# Patient Record
Sex: MALE | Race: WHITE
[De-identification: names, ages, dates, MRNs, and addresses within clinical notes are randomized per-mention and may not be internally consistent; named-entity substitution may affect disease eponyms.]

---

## 2019-10-26 ENCOUNTER — HOSPITAL ENCOUNTER (EMERGENCY)
Dept: HOSPITAL 97 - ER | Age: 64
Discharge: HOME | End: 2019-10-26
Payer: COMMERCIAL

## 2019-10-26 VITALS — TEMPERATURE: 98.8 F

## 2019-10-26 VITALS — SYSTOLIC BLOOD PRESSURE: 131 MMHG | DIASTOLIC BLOOD PRESSURE: 86 MMHG | OXYGEN SATURATION: 99 %

## 2019-10-26 DIAGNOSIS — I10: Primary | ICD-10-CM

## 2019-10-26 LAB
ALBUMIN SERPL BCP-MCNC: 3.3 G/DL (ref 3.4–5)
ALP SERPL-CCNC: 65 U/L (ref 45–117)
ALT SERPL W P-5'-P-CCNC: 61 U/L (ref 12–78)
AST SERPL W P-5'-P-CCNC: 35 U/L (ref 15–37)
BUN BLD-MCNC: 13 MG/DL (ref 7–18)
GLUCOSE SERPLBLD-MCNC: 104 MG/DL (ref 74–106)
HCT VFR BLD CALC: 40.8 % (ref 39.6–49)
INR BLD: 1.04
LYMPHOCYTES # SPEC AUTO: 1 K/UL (ref 0.7–4.9)
MAGNESIUM SERPL-MCNC: 2.3 MG/DL (ref 1.8–2.4)
NT-PROBNP SERPL-MCNC: 748 PG/ML (ref ?–125)
PMV BLD: 9.9 FL (ref 7.6–11.3)
POTASSIUM SERPL-SCNC: 4 MMOL/L (ref 3.5–5.1)
RBC # BLD: 4.67 M/UL (ref 4.33–5.43)
TROPONIN (EMERG DEPT USE ONLY): < 0.02 NG/ML (ref 0–0.04)

## 2019-10-26 PROCEDURE — 85025 COMPLETE CBC W/AUTO DIFF WBC: CPT

## 2019-10-26 PROCEDURE — 99284 EMERGENCY DEPT VISIT MOD MDM: CPT

## 2019-10-26 PROCEDURE — 83735 ASSAY OF MAGNESIUM: CPT

## 2019-10-26 PROCEDURE — 83880 ASSAY OF NATRIURETIC PEPTIDE: CPT

## 2019-10-26 PROCEDURE — 80076 HEPATIC FUNCTION PANEL: CPT

## 2019-10-26 PROCEDURE — 71045 X-RAY EXAM CHEST 1 VIEW: CPT

## 2019-10-26 PROCEDURE — 36415 COLL VENOUS BLD VENIPUNCTURE: CPT

## 2019-10-26 PROCEDURE — 85610 PROTHROMBIN TIME: CPT

## 2019-10-26 PROCEDURE — 84484 ASSAY OF TROPONIN QUANT: CPT

## 2019-10-26 PROCEDURE — 80048 BASIC METABOLIC PNL TOTAL CA: CPT

## 2019-10-26 PROCEDURE — 93005 ELECTROCARDIOGRAM TRACING: CPT

## 2019-10-26 NOTE — EDPHYS
Physician Documentation                                                                           

 [facility]                                                                 

Name: Zoltan Kirbyrzypnegrita                                                                              

Age: 64 yrs                                                                                       

Sex: Male                                                                                         

: 1955                                                                                   

MRN: [id number]                                                                                   

Arrival Date: 10/26/2019                                                                          

Time: 09:18                                                                                       

Account#: [id number]                                                                            

Bed 8                                                                                             

Private MD: Kj Geller E                                                                     

ED Physician Casey Espinoza                                                                         

HPI:                                                                                              

10/26                                                                                             

09:45 This 64 yrs old  Male presents to ER via Ambulatory with complaints of         pm1 

      Shortness Of Breath, High Blood Pressure.                                                   

09:45 Patient with complaints of shortness of breath and elevated blood pressure. Patient saw pm1 

      PCPDuyen recently and had labs. Was called in a prescription for Metoprolol 25 mg BID     

      and instructed to stop the losartan 50 / 12.5 HCTZ Daily. Patient's blood pressure          

      started getting elevated on Thursday. Reports shortness of breath started with elevated     

      blood pressure. No chest pain, cough, nausea or vomiting. Patient' reports highest          

      blood pressure readings of 190's systolic and 120's diastolic .                             

                                                                                                  

Historical:                                                                                       

- Allergies:                                                                                      

09:34 No Known Allergies;                                                                     sg  

- Home Meds:                                                                                      

09:34 Metoprolol Tartrate Oral [Active];                                                      sg  

- PMHx:                                                                                           

09:34 Hypertension;                                                                           sg  

- PSHx:                                                                                           

09:34 None;                                                                                   sg  

                                                                                                  

- Immunization history:: Adult Immunizations up to date.                                          

- Social history:: Smoking status: Patient/guardian denies using tobacco.                         

- Ebola Screening: : Patient negative for fever greater than or equal to 101.5 degrees            

  Fahrenheit, and additional compatible Ebola Virus Disease symptoms Patient denies               

  exposure to infectious person Patient denies travel to an Ebola-affected area in the            

  21 days before illness onset No symptoms or risks identified at this time.                      

                                                                                                  

                                                                                                  

ROS:                                                                                              

16:02 Constitutional: Negative for fever, chills, and weight loss, Eyes: Negative for injury, pm1 

      pain, redness, and discharge, ENT: Negative for injury, pain, and discharge, Neck:          

      Negative for injury, pain, and swelling, Cardiovascular: Negative for chest pain,           

      palpitations, and edema.                                                                    

16:02 Abdomen/GI: Negative for abdominal pain, nausea, vomiting, diarrhea, and constipation,      

      Back: Negative for injury and pain, : Negative for injury, bleeding, discharge, and       

      swelling, MS/Extremity: Negative for injury and deformity, Skin: Negative for injury,       

      rash, and discoloration, Neuro: Negative for headache, weakness, numbness, tingling,        

      and seizure.                                                                                

16:02 Respiratory: Positive for shortness of breath, Negative for cough, dyspnea on exertion,     

      sputum production, wheezing.                                                                

                                                                                                  

Exam:                                                                                             

16:02 Constitutional:  This is a well developed, well nourished patient who is awake, alert,  pm1 

      and in no acute distress. Head/Face:  Normocephalic, atraumatic. Eyes:  Pupils equal        

      round and reactive to light, extra-ocular motions intact.  Lids and lashes normal.          

      Conjunctiva and sclera are non-icteric and not injected.  Cornea within normal limits.      

      Periorbital areas with no swelling, redness, or edema. ENT:  Nares patent. No nasal         

      discharge, no septal abnormalities noted.  Tympanic membranes are normal and external       

      auditory canals are clear.  Oropharynx with no redness, swelling, or masses, exudates,      

      or evidence of obstruction, uvula midline.  Mucous membranes moist. Neck:  Trachea          

      midline, no thyromegaly or masses palpated, and no cervical lymphadenopathy.  Supple,       

      full range of motion without nuchal rigidity, or vertebral point tenderness.  No            

      Meningismus. Chest/axilla:  Normal chest wall appearance and motion.  Nontender with no     

      deformity.  No lesions are appreciated. Cardiovascular:  Regular rate and rhythm with a     

      normal S1 and S2.  No gallops, murmurs, or rubs.  Normal PMI, no JVD.  No pulse             

      deficits. Respiratory:  Lungs have equal breath sounds bilaterally, clear to                

      auscultation and percussion.  No rales, rhonchi or wheezes noted.  No increased work of     

      breathing, no retractions or nasal flaring. Abdomen/GI:  Soft, non-tender, with normal      

      bowel sounds.  No distension or tympany.  No guarding or rebound.  No evidence of           

      tenderness throughout. Back:  No spinal tenderness.  No costovertebral tenderness.          

      Full range of motion. Skin:  Warm, dry with normal turgor.  Normal color with no            

      rashes, no lesions, and no evidence of cellulitis. MS/ Extremity:  Pulses equal, no         

      cyanosis.  Neurovascular intact.  Full, normal range of motion.                             

16:02 Neuro: Orientation: is normal, Motor: is normal, moves all fours, Gait: is steady, at a     

      normal pace, without difficulty.                                                            

                                                                                                  

Vital Signs:                                                                                      

09:23  / 100; Pulse 63; Resp 18; Pulse Ox 100% on R/A;                                  sg  

09:44 Temp 98.8;                                                                              sg  

10:19  / 90; Pulse 60; Resp 17; Pulse Ox 100% on R/A;                                   sg  

11:13  / 86; Pulse 62; Resp 17; Pulse Ox 99% ;                                          sg  

                                                                                                  

MDM:                                                                                              

09:25 Patient medically screened.                                                             pm1 

11:28 Data reviewed: vital signs. Data interpreted: Pulse oximetry: on room air is 99 %.      pm1 

      Interpretation: normal.                                                                     

11:28 Counseling: I had a detailed discussion with the patient and/or guardian regarding: the pm1 

      historical points, exam findings, and any diagnostic results supporting the                 

      discharge/admit diagnosis, lab results, radiology results, the need for outpatient          

      follow up, to return to the emergency department if symptoms worsen or persist or if        

      there are any questions or concerns that arise at home.                                     

11:28 Physician consultation: Kj Geller MD No return phone call or answer with calls at   pm1 

      1050 and 1122. Informed patient that he has not called back and the patient told me         

      that he decided that he will go back to his losartan-HCTZ on his own until he follows       

      up next week. I told the patient that I cannot support his decision to take medications     

      without his PCP's advise. Patient likely needs dual therapy for blood pressure              

      management, perhaps losartan with metoprolol.                                               

                                                                                                  

10/26                                                                                             

09:33 Order name: Basic Metabolic Panel; Complete Time: 10:35                                 pm1 

10/26                                                                                             

09:33 Order name: CBC with Diff; Complete Time: 10:35                                         pm1 

10/26                                                                                             

09:33 Order name: LFT's; Complete Time: 10:35                                                 pm1 

10/26                                                                                             

09:33 Order name: Magnesium; Complete Time: 10:35                                             pm1 

10/26                                                                                             

09:33 Order name: NT PRO-BNP; Complete Time: 10:35                                            pm1 

10/26                                                                                             

09:33 Order name: PT-INR; Complete Time: 10:35                                                pm1 

10/26                                                                                             

09:33 Order name: Troponin (emerg Dept Use Only); Complete Time: 10:35                        pm1 

10/26                                                                                             

09:33 Order name: XRAY Chest (1 view); Complete Time: 11:56                                   pm1 

10/26                                                                                             

09:33 Order name: EKG; Complete Time: 09:34                                                   pm1 

10/26                                                                                             

09:33 Order name: Cardiac monitoring; Complete Time: 09:35                                    pm1 

10/26                                                                                             

09:33 Order name: EKG - Nurse/Tech; Complete Time: :44                                      pm1 

10/26                                                                                             

09:33 Order name: IV Saline Lock; Complete Time: :44                                        pm1 

10/26                                                                                             

09:33 Order name: Labs collected and sent; Complete Time: :44                               pm1 

10/26                                                                                             

09:33 Order name: O2 Per Protocol; Complete Time: 09:35                                       pm1 

10/26                                                                                             

09:33 Order name: O2 Sat Monitoring; Complete Time: :35                                     pm1 

                                                                                                  

Administered Medications:                                                                         

No medications were administered                                                                  

                                                                                                  

                                                                                                  

Disposition:                                                                                      

14:02 Co-signature as Attending Physician, Casey Espinoza MD.                                    rn  

                                                                                                  

Disposition:                                                                                      

10/26/19 11:29 Discharged to Home. Impression: Essential (primary) hypertension.                  

- Condition is Stable.                                                                            

- Discharge Instructions: Hypertension, How to Take Your Blood Pressure, Easy-to-Read,            

  DASH Eating Plan, Managing Your Hypertension.                                                   

                                                                                                  

- Medication Reconciliation Form, Thank You Letter, Antibiotic Education, Prescription            

  Opioid Use form.                                                                                

- Follow up: Emergency Department; When: As needed; Reason: Worsening of condition.               

  Follow up: Kj Geller MD; When: 2 - 3 days; Reason: Recheck today's complaints,            

  Continuance of care, Re-evaluation by your physician.                                           

- Problem is new.                                                                                 

- Symptoms have improved.                                                                         

                                                                                                  

                                                                                                  

                                                                                                  

Signatures:                                                                                       

Dispatcher [employer]                           EDZana Sandoval RN                         RN                                                      

Casey Espinoza MD MD rn Marinas, Patrick, NP                    NP   pm1                                                  

Alden Almendarez RN                      RN   bp                                                   

                                                                                                  

Corrections: (The following items were deleted from the chart)                                    

11:50 11:29 10/26/2019 11:29 Discharged to Home. Impression: Essential (primary)              bp  

      hypertension. Condition is Stable. Forms are Medication Reconciliation Form, Thank You      

      Letter, Antibiotic Education, Prescription Opioid Use. Follow up: Emergency Department;     

      When: As needed; Reason: Worsening of condition. Follow up: Kj Geller; When: 2 - 3      

      days; Reason: Recheck today's complaints, Continuance of care, Re-evaluation by your        

      physician. Problem is new. Symptoms have improved. pm1                                      

                                                                                                  

**************************************************************************************************

## 2019-10-26 NOTE — RAD REPORT
EXAM DESCRIPTION:  [facility] Single View10/26/2019 9:55 am

 

CLINICAL HISTORY:  sob

 

COMPARISON:  none

 

FINDINGS:   The lungs appear clear of acute infiltrate. The heart is normal size

 

IMPRESSION:   No acute abnormalities displayed

## 2019-10-26 NOTE — ER
Nurse's Notes                                                                                     

 [facility]                                                                 

Name: Zoltan Langston                                                                              

Age: 64 yrs                                                                                       

Sex: Male                                                                                         

: 1955                                                                                   

MRN: [id number]                                                                                   

Arrival Date: 10/26/2019                                                                          

Time: 09:18                                                                                       

Account#: [id number]                                                                            

Bed 8                                                                                             

Private MD: Kj Geller E                                                                     

Diagnosis: Essential (primary) hypertension                                                       

                                                                                                  

Presentation:                                                                                     

10/26                                                                                             

09:25 Presenting complaint: Patient states: My Blood pressure has been running high, and I    sg  

      have had some shortness of breath that has worsened today, denies Dizziness, Chest          

      Pain, denies N/V/D/Fever. Reports recently changed from Losartan to Metoprolol but          

      unsure of why  made the change other than having elevated renal labs.               

      Transition of care: patient was not received from another setting of care. Onset of         

      symptoms was 2019. Risk Assessment: Do you want to hurt yourself or someone     

      else? Patient reports no desire to harm self or others. Initial Sepsis Screen: Does the     

      patient meet any 2 criteria? No. Patient's initial sepsis screen is negative. Does the      

      patient have a suspected source of infection? No. Patient's initial sepsis screen is        

      negative. Care prior to arrival: None.                                                      

09:25 Method Of Arrival: Ambulatory                                                           sg  

09:25 Acuity: AIDEE 3                                                                           sg  

                                                                                                  

Historical:                                                                                       

- Allergies:                                                                                      

09:34 No Known Allergies;                                                                     sg  

- Home Meds:                                                                                      

09:34 Metoprolol Tartrate Oral [Active];                                                      sg  

- PMHx:                                                                                           

09:34 Hypertension;                                                                           sg  

- PSHx:                                                                                           

09:34 None;                                                                                   sg  

                                                                                                  

- Immunization history:: Adult Immunizations up to date.                                          

- Social history:: Smoking status: Patient/guardian denies using tobacco.                         

- Ebola Screening: : Patient negative for fever greater than or equal to 101.5 degrees            

  Fahrenheit, and additional compatible Ebola Virus Disease symptoms Patient denies               

  exposure to infectious person Patient denies travel to an Ebola-affected area in the            

  21 days before illness onset No symptoms or risks identified at this time.                      

                                                                                                  

                                                                                                  

Screenin:34 Abuse screen: Denies threats or abuse. Denies injuries from another. Nutritional        sg  

      screening: No deficits noted. Tuberculosis screening: No symptoms or risk factors           

      identified. Never had TB. Fall Risk None identified.                                        

                                                                                                  

Assessment:                                                                                       

09:34 General: Appears in no apparent distress. well groomed, well developed, well nourished, sg  

      Behavior is calm, cooperative, appropriate for age. Pain: Denies pain. Neuro: Level of      

      Consciousness is awake, alert, obeys commands, Oriented to person, place, time,        

      are equal bilaterally Moves all extremities. Speech is normal, Facial symmetry appears      

      normal, Denies weakness blurred vision dizziness, difficulty swallowing, paresthesias       

      numbness headache photophobia diplopia. Cardiovascular: Capillary refill is brisk in        

      bilateral fingers Patient's skin is warm and dry. Chest pain is denied. Respiratory:        

      Airway is patent Respiratory effort is even, unlabored, Respiratory pattern is regular,     

      symmetrical, Breath sounds are clear bilaterally. GI: Abdomen is round obese, Reports       

      normal bowel habits, tolerance of fluids, tolerance of food. : No signs and/or            

      symptoms were reported regarding the genitourinary system. EENT: No signs and/or            

      symptoms were reported regarding the EENT system. Derm: Skin is pink, warm \T\ dry.         

      Musculoskeletal: Circulation, motion, and sensation intact. Range of motion: intact in      

      all extremities.                                                                            

10:30 Cardiovascular: Rhythm is sinus rhythm.                                                 bp  

11:48 Reassessment: PT D/C HOME AMBULATORY WITH FAMILY, DX WITH ESSENTIAL HTN.                bp  

                                                                                                  

Vital Signs:                                                                                      

09:23  / 100; Pulse 63; Resp 18; Pulse Ox 100% on R/A;                                  sg  

09:44 Temp 98.8;                                                                              sg  

10:19  / 90; Pulse 60; Resp 17; Pulse Ox 100% on R/A;                                   sg  

11:13  / 86; Pulse 62; Resp 17; Pulse Ox 99% ;                                          sg  

                                                                                                  

ED Course:                                                                                        

09:18 Patient arrived in ED.                                                                  ag5 

09:18 Kj Geller MD is Private Physician.                                                ag5 

09:22 Arm band placed on.                                                                     sg  

09:24 Zana Mann, RN is Primary Nurse.                                                       sg  

09:25 Celso Velasquez NP is PHCP.                                                           pm1 

09:25 Casey Espinoza MD is Attending Physician.                                                pm1 

09:33 Triage completed.                                                                       sg  

09:45 Patient has correct armband on for positive identification. Bed in low position. Call   bp  

      light in reach. Side rails up X2. Adult w/ patient.                                         

09:51 XRAY Chest (1 view) In Process Unspecified.                                             EDMS

09:55 EKG done, by ED staff, reviewed by Celso Velasquez NP.                                  jb1 

10:00 Initial lab(s) drawn, by me, sent to lab. Inserted saline lock: 22 gauge in right       sg  

      antecubital area, using aseptic technique. Blood collected.                                 

10:49 Nurse Practitioner and/or Physician Assistant to see patient.                           sg  

11:29 Kj Geller MD is Referral Physician.                                               pm1 

11:49 No provider procedures requiring assistance completed. IV discontinued, intact,         bp  

      bleeding controlled, No redness/swelling at site. Pressure dressing applied.                

                                                                                                  

Administered Medications:                                                                         

No medications were administered                                                                  

                                                                                                  

                                                                                                  

Outcome:                                                                                          

11:29 Discharge ordered by MD.                                                                pm1 

11:49 Discharged to home ambulatory, with family.                                             bp  

11:49 Condition: stable                                                                           

11:49 Discharge instructions given to patient, Instructed on discharge instructions, follow       

      up and referral plans. Demonstrated understanding of instructions, follow-up care.          

11:50 Patient left the ED.                                                                    bp  

                                                                                                  

Signatures:                                                                                       

Dispatcher [employer]                           EDEvan Pompa                                 jb1                                                  

Zana Mann, RN                         RN   sg                                                   

Celso Velasquez, NP                    NP   pm1                                                  

Alden Almendarez RN                      RN   bp                                                   

Dov Ruggiero                                ag5                                                  

                                                                                                  

**************************************************************************************************

## 2019-10-27 NOTE — EKG
Test Date:    2019-10-26               Test Time:    09:43:04

Technician:   JOAQUIN                                    

                                                     

MEASUREMENT RESULTS:                                       

Intervals:                                           

Rate:         64                                     

CA:           148                                    

QRSD:         106                                    

QT:           404                                    

QTc:          416                                    

Axis:                                                

P:            21                                     

CA:           148                                    

QRS:          14                                     

T:            43                                     

                                                     

INTERPRETIVE STATEMENTS:                                       

                                                     

Normal sinus rhythm

Normal ECG

No previous ECG available for comparison



Electronically Signed On 10-27-19 19:53:05 CDT by Jeffry Olea

## 2020-12-19 ENCOUNTER — HOSPITAL ENCOUNTER (INPATIENT)
Dept: HOSPITAL 97 - ER | Age: 65
LOS: 2 days | Discharge: HOME | DRG: 177 | End: 2020-12-21
Attending: FAMILY MEDICINE | Admitting: FAMILY MEDICINE
Payer: COMMERCIAL

## 2020-12-19 VITALS — BODY MASS INDEX: 39.7 KG/M2

## 2020-12-19 DIAGNOSIS — E87.0: ICD-10-CM

## 2020-12-19 DIAGNOSIS — K76.0: ICD-10-CM

## 2020-12-19 DIAGNOSIS — N28.9: ICD-10-CM

## 2020-12-19 DIAGNOSIS — D72.819: ICD-10-CM

## 2020-12-19 DIAGNOSIS — D69.6: ICD-10-CM

## 2020-12-19 DIAGNOSIS — Z79.52: ICD-10-CM

## 2020-12-19 DIAGNOSIS — N18.30: ICD-10-CM

## 2020-12-19 DIAGNOSIS — J12.89: ICD-10-CM

## 2020-12-19 DIAGNOSIS — E87.6: ICD-10-CM

## 2020-12-19 DIAGNOSIS — I12.9: ICD-10-CM

## 2020-12-19 DIAGNOSIS — Z79.899: ICD-10-CM

## 2020-12-19 DIAGNOSIS — E66.9: ICD-10-CM

## 2020-12-19 DIAGNOSIS — Z79.82: ICD-10-CM

## 2020-12-19 DIAGNOSIS — U07.1: Primary | ICD-10-CM

## 2020-12-19 DIAGNOSIS — J96.01: ICD-10-CM

## 2020-12-19 LAB
ALBUMIN SERPL BCP-MCNC: 3.3 G/DL (ref 3.4–5)
ALP SERPL-CCNC: 62 U/L (ref 45–117)
ALT SERPL W P-5'-P-CCNC: 45 U/L (ref 12–78)
AST SERPL W P-5'-P-CCNC: 44 U/L (ref 15–37)
BLD SMEAR INTERP: (no result)
BUN BLD-MCNC: 23 MG/DL (ref 7–18)
GLUCOSE SERPLBLD-MCNC: 99 MG/DL (ref 74–106)
HCT VFR BLD CALC: 42.2 % (ref 39.6–49)
INR BLD: 1.05
LIPASE SERPL-CCNC: 238 U/L (ref 73–393)
LYMPHOCYTES # SPEC AUTO: 0.5 K/UL (ref 0.7–4.9)
MAGNESIUM SERPL-MCNC: 2.4 MG/DL (ref 1.8–2.4)
MORPHOLOGY BLD-IMP: (no result)
PMV BLD: 10.4 FL (ref 7.6–11.3)
POTASSIUM SERPL-SCNC: 3.3 MMOL/L (ref 3.5–5.1)
RBC # BLD: 5.02 M/UL (ref 4.33–5.43)
TROPONIN (EMERG DEPT USE ONLY): < 0.02 NG/ML (ref 0–0.04)

## 2020-12-19 PROCEDURE — 85025 COMPLETE CBC W/AUTO DIFF WBC: CPT

## 2020-12-19 PROCEDURE — 82728 ASSAY OF FERRITIN: CPT

## 2020-12-19 PROCEDURE — 36415 COLL VENOUS BLD VENIPUNCTURE: CPT

## 2020-12-19 PROCEDURE — 85610 PROTHROMBIN TIME: CPT

## 2020-12-19 PROCEDURE — 94760 N-INVAS EAR/PLS OXIMETRY 1: CPT

## 2020-12-19 PROCEDURE — 80076 HEPATIC FUNCTION PANEL: CPT

## 2020-12-19 PROCEDURE — 82947 ASSAY GLUCOSE BLOOD QUANT: CPT

## 2020-12-19 PROCEDURE — 84484 ASSAY OF TROPONIN QUANT: CPT

## 2020-12-19 PROCEDURE — 86140 C-REACTIVE PROTEIN: CPT

## 2020-12-19 PROCEDURE — 96374 THER/PROPH/DIAG INJ IV PUSH: CPT

## 2020-12-19 PROCEDURE — 82550 ASSAY OF CK (CPK): CPT

## 2020-12-19 PROCEDURE — 84145 PROCALCITONIN (PCT): CPT

## 2020-12-19 PROCEDURE — 71275 CT ANGIOGRAPHY CHEST: CPT

## 2020-12-19 PROCEDURE — 83690 ASSAY OF LIPASE: CPT

## 2020-12-19 PROCEDURE — 80053 COMPREHEN METABOLIC PANEL: CPT

## 2020-12-19 PROCEDURE — 74176 CT ABD & PELVIS W/O CONTRAST: CPT

## 2020-12-19 PROCEDURE — 81015 MICROSCOPIC EXAM OF URINE: CPT

## 2020-12-19 PROCEDURE — 87088 URINE BACTERIA CULTURE: CPT

## 2020-12-19 PROCEDURE — 93005 ELECTROCARDIOGRAM TRACING: CPT

## 2020-12-19 PROCEDURE — 76377 3D RENDER W/INTRP POSTPROCES: CPT

## 2020-12-19 PROCEDURE — 87086 URINE CULTURE/COLONY COUNT: CPT

## 2020-12-19 PROCEDURE — 81003 URINALYSIS AUTO W/O SCOPE: CPT

## 2020-12-19 PROCEDURE — 83735 ASSAY OF MAGNESIUM: CPT

## 2020-12-19 PROCEDURE — 80048 BASIC METABOLIC PNL TOTAL CA: CPT

## 2020-12-19 PROCEDURE — 71045 X-RAY EXAM CHEST 1 VIEW: CPT

## 2020-12-19 PROCEDURE — 99285 EMERGENCY DEPT VISIT HI MDM: CPT

## 2020-12-19 RX ADMIN — Medication SCH ML: at 20:00

## 2020-12-19 RX ADMIN — METHYLPREDNISOLONE SODIUM SUCCINATE SCH MG: 40 INJECTION, POWDER, FOR SOLUTION INTRAMUSCULAR; INTRAVENOUS at 20:58

## 2020-12-19 RX ADMIN — SODIUM CHLORIDE SCH MLS: 0.9 INJECTION, SOLUTION INTRAVENOUS at 20:00

## 2020-12-19 NOTE — RAD REPORT
EXAM DESCRIPTION:  CT - Stone Protocol - 12/19/2020 4:12 pm

 

CLINICAL HISTORY:  right flank pain

 

COMPARISON:  <Comparisons>

 

TECHNIQUE:  Axial 5 mm thick images were obtained without oral or IV contrast. The field-of-view span
s the entirety of the  system including uppermost abdomen and lung bases.

 

All CT scans are performed using dose optimization technique as appropriate and may include automated
 exposure control or mA/KV adjustment according to patient size.

 

FINDINGS:  No hydronephrosis is present and no obstructing ureteral calculi. No suspicious renal mass
es. Isodense masses and pyelonephritis are not excluded on a stone protocol CT scan. An exophytic 3.5
 centimeter mass lateral upper pole right kidney is most likely an incidental cyst. No significant ad
renal finding. No urinary bladder suspicious finding.

 

Imaged portions of the liver, spleen and pancreas show no suspicious findings on non-contrast imaging
. Liver is mildly fatty infiltrated. No gallbladder or biliary tree abnormality identified.

 

No acute bowel findings. Appendix is normal. Prominent diverticulosis noted in a tortuous and redunda
nt sigmoid colon. No acute GI process seen.

 

No mass or bulky lymphadenopathy. Patient has a very small 10 mm umbilical hernia along with bilatera
l fat filled inguinal hernias. No free air, free fluid or inflammatory stranding.

 

No significant bony abnormality.

 

IMPRESSION:  Noncontrast CT abdomen and pelvis imaging showing no acute or emergent finding.

 

Isodense masses and pyelonephritis are not excluded on stone protocol technique.

 

No acute GI process.

## 2020-12-19 NOTE — XMS REPORT
Continuity of Care Document

                          Created on:2020



Patient:FER MCKEON

Sex:Male

:1955

External Reference #:[id number]





Demographics







                          Address                   [address]



                                                    [place] [address]

 

                          Home Phone                [phone]

 

                          Work Phone                [phone]

 

                          Mobile Phone              [phone]

 

                          Email Address             [email or web address]

 

                          Preferred Language        en

 

                          Marital Status            Unknown

 

                          Gnosticism Affiliation     Unknown

 

                          Race                      Unknown

 

                          Additional Race(s)        White

 

                          Ethnic Group              Not  or 









Author







                          Organization              [facility]

t

 

                          Address                   [address]



                                                    [place] [address]

 

                          Phone                     [phone]









Support







                Name            Relationship    Address         Phone

 

                Ivis      Unavailable     [address]     [phone]



                                                [place] [address] 

 

                Annettaypcak       Unavailable     [address]    [phone]



                                                [place] [address] 









Care Team Providers







                    Name                Role                Phone

 

                    Unavailable         Unavailable         Unavailable









Problems

This patient has no known problems.



Allergies, Adverse Reactions, Alerts

This patient has no known allergies or adverse reactions.



Medications







       Ordered Filled Start  Stop   Current Ordering Indication Dosage Frequency

 Signature

                    Comments            Components          Source



     Medication Medication Date Date Medication? Clinician                (SIG) 

          



     Name Name                                                   

 

     Hydrochloro Hydrochloro -0      Yes  Na Degroot                1 tablet   

        [facility]



     thiazide thiazide 4-01                               in the           [facility]

 -



               00:00:                               morning           [facility]



               00                                                l



                                                                 Outpati



                                                                 ent



                                                                 Clinics

 

     Losartan Losartan -0      Yes  Na Degroot                1 tablet         

  [facility]



     Potassium Potassium 4-01                                              [facility]

 [facility]



               00[facility]                                              [facility]



                                                                 [facility]

 

     Neomycin-Po Neomycin-Po -0      Yes  Na Degroot                4 drops    

       [facility]



     lymyxin-HC lymyxin-HC 3-24                               into           [place]

es -



               00:00:                               affected           [facility]



               00                                 [facility]            [facility]



                                                                 [facility]



                                                                 [facility]



                                                                 [facility]







Immunizations







           Ordered    Filled Immunization Date       Status     Comments   Sourc

e



           Immunization Name Name                                        

 

           Prevnar 13 Prevnar 13 2020 Completed             [facility] -



           -Pneumonia Vaccine -Pneumonia Vaccine 00:00:00                       

  [facility]







Procedures

This patient has no known procedures.



Encounters







        Start   End     Encounter Admission Attending Care    Care    Encounter 

Source



        Date/Time Date/Time Type    Type    Clinicians Facility Department ID   

   

 

        2020-10-08 2020-10-08 Outpatient                 [facility]  [facility]  [id number]

 [facility]



        00[facility]00[facility]00 00:00[facility]00                                                 [facility] 

[facility]



                                                                        [facility]



                                                                        [facility]inics

 

        2020 Outpatient                 [facility]

[id number] [facility]



        10:40:00 10:40:00                         t Remote Assistant   [facility]



                                                Medicine                 Outpati



                                                                        ent



                                                                        Clinics

 

        2020 Outpatient                 [facility]

[id number] [facility]



        10:00:00 10:00:00                         t Remote Assistant   [facility]



                                                Medicine                 Outpati



                                                                        ent



                                                                        Clinics

 

        2020 Outpatient                 [facility]

[id number] [facility]



        11:20:00 11:20:00                         t Remote Assistant   [facility]



                                                Medicine                 Outpati



                                                                        ent



                                                                        Clinics

 

        2020 Outpatient                 [facility]

[id number] [facility]



        08:38:00 08:38:00                         [facility]



                                                Medicine                 Outpati



                                                                        ent



                                                                        Clinics

 

        2020 Outpatient                 [facility]

[id number] [facility]



        08:00:00 08:00:00                         [facility]



                                                Medicine                 Outpati



                                                                        ent



                                                                        Clinics

 

        2019 Outpatient                 [facility]

[id number] [facility]



        08:00:00 08:00:00                         [facility]



                                                Medicine                 Outpati



                                                                        ent



                                                                        Clinics

 

        2019 Outpatient                 [facility]

[id number] [facility]



        10:00:00 10:00:00                         [facility]



                                                Medicine                 Outpati



                                                                        ent



                                                                        Clinics







Results

This patient has no known results.

## 2020-12-19 NOTE — P.HP
Certification for Inpatient


Patient admitted to: Inpatient


With expected LOS: >2 Midnights


Practitioner: I am a practitioner with admitting privileges, knowledge of 

patient current condition, hospital course, and medical plan of care.


Services: Services provided to patient in accordance with Admission requirements

found in Title 42 Section 412.3 of the Code of Federal Regulations





Patient History


Date of Service: 12/19/20


Reason for admission: Shortness of breath, hypertension


History of Present Illness: 





65 yrs old  Male with past medical history hypertension presents to ER 

with complaints of low blood pressure and low back pain which has been going on 

since this morning.  He was diagnosed with covid  19 3 days back also has 

shortness of breath on exertion, denies any chest pain firm, no fever or chills


Denies any nausea vomiting or diarrhea


Patient was assessed in the ER and was found to have hypertension and was 

started on fluids.  He was also found to be hypoxic and had to be started on 

oxygen supplementation.  The patient is being admitted for further management





Review of Systems


10-point ROS is otherwise unremarkable





Physical Examination





- Vital Signs


Temperature: 99.2 F


Blood Pressure: 106/68


Pulse: 76


Respirations: 18


Pulse Ox (%): 89





- Physical Exam


General: Alert, In no apparent distress, Obese


HEENT: Atraumatic, Normocephalic


Neck: Supple


Respiratory: Diminished, Crackles/rales


Cardiovascular: Regular rate/rhythm, Normal S1 S2


Capillary refill: <2 Seconds


Gastrointestinal: Soft and benign, W/out hepatosplenomegaly


Musculoskeletal: No clubbing, No swelling


Integumentary: No rashes, No breakdown


Neurological: Normal speech, Normal strength at 5/5 x4 extr


Lymphatics: No axilla or inguinal lymphadenopathy





- Studies


Laboratory Data (last 24 hrs)





12/19/20 15:00: PT 12.4, INR 1.05


12/19/20 15:00: WBC 3.0 L, Hgb 14.2, Hct 42.2, Plt Count 97 L


12/19/20 15:00: Sodium 135 L, Potassium 3.3 L, BUN 23 H, Creatinine 1.45 H, 

Glucose 99, Magnesium 2.4, Total Bilirubin 0.6, AST 44 H, ALT 45, Alkaline 

Phosphatase 62, Lipase 238








Assessment and Plan





- Problems (Diagnosis)


(1) Acute respiratory failure with hypoxia


Current Visit: Yes   Status: Acute   





(2) COVID-19


Current Visit: Yes   Status: Acute   





- Plan








COVID 19 pneumonia


Acute hypoxic respiratory failure


Hypotension


Possible sepsis


History of hypertension


Hypernatremia


Hypokalemia


Acute renal insufficiency








Plan


Monitor under telemetry


Oxygen supplementation


Start on fluids


Steroids IV


IV antibiotics to cover for CAP


Will hold her antihypertensives for now


Correct electrolytes


Will monitor CRP


 CT of the chest with contrast to rule out PE


GI/DVT prophylaxis


Advanced directives full code for now











- Advance Directives


Does patient have a Living Will: No


Does patient have a Durable POA for Healthcare: No


Time Spent Managing Pts Care (In Minutes): 45

## 2020-12-19 NOTE — XMS REPORT
Summarization of Episode Note

                           Created on:October 10, 2020



Patient:Zoltan Langston

Sex:Male

:1955

External Reference #:[id number]





Demographics







                          Address                   [address] [place] [address]

 

                          Home Phone                [phone]

 

                          Mobile Phone              [phone]

 

                          Email Address             [email or web address]

 

                          Preferred Language        en

 

                          Marital Status            Unknown

 

                          Yazdanism Affiliation     Unknown

 

                          Race                      White

 

                          Ethnic Group              Not  or 









Author







                          Organization              [facility]

 

                          Address                   [address], [facility] [address]



                                                    [place] [address]

 

                          Phone                     [phone]









Support







                Name            Relationship    Address         Phone

 

                Ivis      Unavailable     [address]     [phone]



                                                [place] [address] 

 

                Cony       Unavailable     [address]    [phone]



                                                [place] [address] 









Care Team Providers







                    Name                Role                Phone

 

                    Degroot                Unavailable         [phone]









PROBLEMS







        Type    Condition ICD9-CM ZDD20-IG Onset   Condition SNOMED Code Notes



                        Code    Code    Dates   Status          

 

        Problem Morbid obesity due         E66.01          Active  932173104 



                to excess calories                                         

 

        Problem Hyperuricemia         E79.0           Active  45525343 

 

        Problem Essential         I10             Active  44890711 



                hypertension                                         

 

        Problem Establishing care         Z76.89          Active  [id number] 



                with new doctor,                                         



                encounter for                                         

 

        Problem Hypertension,         I10             Active  67835306 



                unspecified type                                         

 

        Problem Acute bacterial         H10.31          Active  830507913 



                conjunctivitis of                                         



                right eye                                         







ALLERGIES

No Known Allergies



ENCOUNTERS from 1955 to 2020-10-09







             Encounter    Location     Date         Provider     Diagnosis

 

             [facility] [address] [facility] [address] [facility] [address] [address] Oct, 2020 

[place] [address]                           







IMMUNIZATIONS







                Vaccine         Route           Administration Date Status

 

                Prevnar 13 -Pneumonia Vaccine IM Intramuscular Aug 19, 2020    A

dministered







SOCIAL HISTORY

Tobacco Use:





                    Social History Observation Description         Date

 

                    Details (start date - stop date) Current Smoker      



Sex Assigned At Birth:





                          Social History Observation Description

 

                          Sex Assigned At Birth     Unknown



Alcohol Screen





                    Question            Answer              Notes

 

                    Did you have a drink containing alcohol in the past Yes     

            



                    year?                                   

 

                    Points              2                   

 

                    Interpretation      Negative            

 

                    How often did you have 6 or more drinks on one Less than mon

thly (1 point) 



                    occasion in the past year?                     

 

                    How many drinks did you have on a typical day when 1 or 2 (0

 points)   



                    you were drinking in the past year?                     

 

                    How often did you have a drink containing alcohol Monthly or

 less (1 point) 



                    in the past year?                       



Tobacco Use/Smoking





                    Question            Answer              Notes

 

                    Are you a           current smoker      

 

                    How many cigarettes a day do you smoke? 11-               







REASON FOR REFERRAL

No Information



VITAL SIGNS

No information



MEDICATIONS







             Medication   SIG (Take, Route, Start Date   End Date     Status



                          Frequency, Duration)                           

 

             Losartan Potassium 50 MG 1 tablet Orally Once                      

     Active



                          a day for 90 days                           

 

             Neomycin-Polymyxin-HC 4 drops into affected 24 Mar, 2020           

   Not-Taking



             3.5-04239-2  ear Otic Three times                           



                          a day for 7 days                           

 

             Hydrochlorothiazide 12.5 MG 1 tablet in the                        

   Active



                          morning Orally Once a                           



                          day for 90 days                           







PROCEDURES

No Information



RESULTS

No Results



REASON FOR VISIT

shortness of breath



MEDICAL (GENERAL) HISTORY







                    Type                Description         Date

 

                    Medical History     Essential hypertension 

 

                    Medical History     psoriasis           

 

                    Surgical History    No Surgical history information 







Goals Section

No Information



Health Concerns

No Information



MEDICAL EQUIPMENT

No Information



MENTAL STATUS

No Information



FUNCTIONAL STATUS

No Information



ASSESSMENTS

No Information



PLAN OF TREATMENT

Medication





                Medication Name Sig             Start Date      Stop Date

 

                Losartan Potassium 50 MG 1 tablet Orally Once a day for 90      

           



                                days                            

 

                Hydrochlorothiazide 12.5 MG 1 tablet in the morning Orally Once 

                



                                a day for 90 days                 







Insurance Providers







          Payer Name Payer     Payer     Insured   Patient   Coverage  Coverage 

End



                    Address   Phone     Name      Relationship to Start Date Praneeth

e



                                                  Insured             

 

          [employer] [address]    [phone] [place], self                



          [facility]     [id number]  [facility]       [employer] [place]                                           



          Replace   [id number]

## 2020-12-19 NOTE — ER
Nurse's Notes                                                                                     

 [facility]                                                                 

Name: Zoltan Langston                                                                              

Age: 65 yrs                                                                                       

Sex: Male                                                                                         

: 1955                                                                                   

MRN: [id number]                                                                                   

Arrival Date: 2020                                                                          

Time: 14:17                                                                                       

Account#: [id number]                                                                            

Bed 7                                                                                             

Private MD:                                                                                       

Diagnosis: Coronavirus infection, unspecified;Hypoxemia                                           

                                                                                                  

Presentation:                                                                                     

                                                                                             

14:45 Chief complaint: Patient states: hypotension and mid back pain R>L side x 2 days. COVID sv  

      + on 20. c/o dry mouth. Denies dizziness, SOB, CP. Coronavirus screen: Client         

      presents with at least one sign or symptom that may indicate coronavirus-19.                

      Standard/surgical mask placed on the client. Provider contacted for isolation               

      considerations. Client reports previous positive COVID test result. Ebola Screen: No        

      symptoms or risks identified at this time. Risk Assessment: Do you want to hurt             

      yourself or someone else? Patient reports no desire to harm self or others. Onset of        

      symptoms was 2020.                                                             

14:45 Method Of Arrival: Ambulatory                                                           sv  

14:45 Acuity: AIDEE 3                                                                           sv  

14:45 Initial Sepsis Screen: Does the patient meet any 2 criteria? RR > 20 per min. No.       sv  

      Patient's initial sepsis screen is negative. Does the patient have a suspected source       

      of infection? No. Patient's initial sepsis screen is negative.                              

                                                                                                  

Historical:                                                                                       

- Allergies:                                                                                      

14:47 No Known Allergies;                                                                     sv  

- PMHx:                                                                                           

14:47 Hypertension;                                                                           sv  

- PSHx:                                                                                           

14:47 None;                                                                                   sv  

                                                                                                  

- Immunization history:: Adult Immunizations.                                                     

- Social history:: Smoking status: .                                                              

                                                                                                  

                                                                                                  

Screening:                                                                                        

15:13 Abuse screen: Denies threats or abuse. Nutritional screening: No deficits noted.        ll1 

      Tuberculosis screening: No symptoms or risk factors identified. Fall Risk IV access (20     

      points). Gait- Weak (10 pts.). Total May Fall Scale indicates Low Risk Score (25-44       

      pts). Fall prevention measures have been instituted. Side Rails Up X 2 Frequent             

      Obs/Assesments occuring Family Present and informed to notify staff if they need to         

      leave bedside As available Patient and Family Educated on Fall Prevention Program and       

      strategies.                                                                                 

                                                                                                  

Assessment:                                                                                       

14:43 Reassessment: provider at bedside at this time.                                         ll1 

14:45 General: Appears in no apparent distress. obese, well groomed, Behavior is calm,        tw2 

      cooperative, appropriate for age. Pain: Complains of pain in left mid back and right        

      mid back. Neuro: Level of Consciousness is awake, alert, obeys commands, Oriented to        

      person, place, time, situation. Cardiovascular: Patient's skin is warm and dry.             

      Cardiovascular: Heart tones S1 S2. Respiratory: Airway is patent Respiratory effort is      

      even, unlabored, Respiratory pattern is regular, symmetrical, Breath sounds are             

      diminished bilaterally. GI: Abdomen is distended, non-distended, obese, Bowel sounds        

      present X 4 quads. : No signs and/or symptoms were reported regarding the                 

      genitourinary system. EENT: Reports dry mouth. Derm: No signs and/or symptoms reported      

      regarding the dermatologic system. Musculoskeletal: Range of motion: intact in all          

      extremities.                                                                                

15:57 Reassessment: Patient appears in no apparent distress at this time. No changes from     tw2 

      previously documented assessment. Patient and/or family updated on plan of care and         

      expected duration. Pain level reassessed. Patient is alert, oriented x 3, equal             

      unlabored respirations, skin warm/dry/pink.                                                 

17:02 Reassessment: Patient appears in no apparent distress at this time. No changes from     tw2 

      previously documented assessment. Patient and/or family updated on plan of care and         

      expected duration. Pain level reassessed. Patient is alert, oriented x 3, equal             

      unlabored respirations, skin warm/dry/pink.                                                 

17:20 Reassessment: hospitalist at bedside at this time.                                      tw2 

17:41 Reassessment: Patient appears in no apparent distress at this time. No changes from     tw2 

      previously documented assessment. Patient and/or family updated on plan of care and         

      expected duration. Pain level reassessed. Patient is alert, oriented x 3, equal             

      unlabored respirations, skin warm/dry/pink.                                                 

19:20 Reassessment: Patient appears in no apparent distress at this time. Neuro: Level of     lp1 

      Consciousness is awake, alert, obeys commands, Oriented to person, place, time,             

      situation. Respiratory: Respiratory effort is even, Respiratory pattern is regular,         

      Breath sounds are diminished bilaterally. Derm: Skin is intact, Skin is dry, Skin is        

      pale.                                                                                       

19:26 Reassessment: Patient transported to CT via stretcher.                                  lp1 

                                                                                                  

Vital Signs:                                                                                      

14:45  / 83; Pulse 87; Resp 24; Temp 98.7(O); Pulse Ox 95% on R/A;                      sv  

15:36  / 80 Sitting; Pulse 77; Resp 20; Pulse Ox 92% on R/A;                            tw2 

15:36  / 88 Standing; Pulse 89; Resp 20; Pulse Ox 92% on R/A;                           tw2 

15:36  / 81 Supine; Pulse 78; Resp 20; Pulse Ox 90% on R/A;                             tw2 

15:57  / 70; Pulse 88; Resp 20; Pulse Ox 93% on R/A;                                    tw2 

16:59  / 74; Pulse 81; Resp 20; Pulse Ox 91% on R/A;                                    tw2 

17:41  / 51; Pulse 84; Resp 20; Pulse Ox 88% on R/A;                                    tw2 

19:24  / 69; Pulse 85; Resp 21; Temp 97.9(O); Pulse Ox 92% on 2 lpm NC; Pain 0/10;      lp1 

17:41 pt placed on 2L nc at this time, will continue to monitor                               tw2 

                                                                                                  

ED Course:                                                                                        

14:17 Patient arrived in ED.                                                                  rg4 

14:20 Enrike Lim PA is PHCP.                                                                cp  

14:20 Casey Espinoza MD is Attending Physician.                                                cp  

14:46 Triage completed.                                                                       sv  

14:47 Arm band placed on.                                                                     sv  

15:00 Inserted saline lock: 20 gauge in right antecubital area, using aseptic technique.      ll1 

      Blood collected.                                                                            

15:14 Patient has correct armband on for positive identification. Bed in low position. Call   ll1 

      light in reach. Side rails up X 1. Cardiac monitor on. Pulse ox on. NIBP on.                

15:17 Mya Jacob RN is Primary Nurse.                                                        tw2 

15:57 XRAY Chest (1 view) In Process Unspecified.                                             EDMS

16:12 CBC Smear Scan Sent.                                                                    sv  

16:12 Magnesium Sent.                                                                         sv  

16:13 CT Stone Protocol In Process Unspecified.                                               EDMS

16:13 LFT's Sent.                                                                             sv  

16:13 CBC with Diff Sent.                                                                     sv  

16:13 Basic Metabolic Panel Sent.                                                             sv  

17:14 Jaylen Guevara MD is Hospitalizing Provider.                                           cp  

19:01 Report given to KALA Harp.                                                               tw2 

19:19 No provider procedures requiring assistance completed. Patient admitted, IV remains in  lp1 

      place.                                                                                      

                                                                                                  

Administered Medications:                                                                         

15:14 Drug: NS 0.9% 500 ml Route: IV; Rate: bolus; Site: right antecubital;                   ll1 

15:35 Drug: SOLU-Medrol 80 mg Route: IVP; Site: right antecubital;                            tw2 

15:53 Follow up: Response: No adverse reaction                                                tw2 

15:40 Drug: Albuterol HFA Inhaler 2 puffs Route: Inhalation;                                  tw2 

15:56 Drug: Potassium Effervescent Tablet 50 mEq Route: PO;                                   tw2 

17:21 Follow up: Response: No adverse reaction                                                tw2 

                                                                                                  

                                                                                                  

Outcome:                                                                                          

17:15 Decision to Hospitalize by Provider.                                                    cp  

19:19 Condition: stable                                                                       lp1 

19:19 Instructed on the need for admit.                                                           

19:34 Admitted to Tele via wheelchair, room 407, with oxygen, with chart, Report called to    lp1 

      KALA Jung                                                                                   

19:52 Patient left the ED.                                                                    1 

                                                                                                  

Signatures:                                                                                       

Dispatcher [employer]                           EDBrittney Kim RN RN   sv                                                   

Katiuska Carrillo RN                         RN   1                                                  

Enrike Lim PA                         PA   cp                                                   

Mya Jacob RN RN   2                                                  

Charlette Villarreal                                 4                                                  

Magdi Schmitz RN                       RN   1                                                  

                                                                                                  

Corrections: (The following items were deleted from the chart)                                    

14:50 14:45 Temp 98.7F Oral; sv                                                               sv  

                                                                                                  

**************************************************************************************************

## 2020-12-19 NOTE — RAD REPORT
EXAM DESCRIPTION:  CT - Chest For Pe Angio - 12/19/2020 7:45 pm

 

CLINICAL HISTORY:   COVID 19,

 

COMPARISON:  Stone Protocol dated 12/19/2020

 

TECHNIQUE:  Dynamically enhanced 3 mm thick images of the chest were obtained during administration o
f approximately 150mL Isovue 370 IV contrast. Coronal and oblique MIP reconstruction images were gene
rated and reviewed. Exam utilizes a protocol to evaluate the pulmonary arterial tree.

 

All CT scans are performed using dose optimization technique as appropriate and may include automated
 exposure control or mA/KV adjustment according to patient size.

 

FINDINGS:  Pulmonary arterial tree contrast density is not optimal. This limits the far peripheral br
anch assessment. No central pulmonary emboli are present. Far peripheral branch embolic disease is un
likely.

 

The aorta as imaged shows no acute or suspicious finding. No pericardial thickening or effusion.

 

Scarring changes are present in the lung parenchyma. Emphysema is seen. No acute lung parenchymal pro
cess suspected. No pleural effusion or pleural thickening.

 

No mediastinal or hilar suspicious masses. No chest wall masses or abnormal axillary lymphadenopathy.


 

IMPRESSION:  Exam was suboptimal but no pulmonary emboli identifiable.

 

No acute or emergent finding seen.

## 2020-12-19 NOTE — RAD REPORT
EXAM DESCRIPTION:  RAD - Chest Single View - 12/19/2020 3:57 pm

 

CLINICAL HISTORY:  COUGH, right greater than left back pain

 

COMPARISON:  Portable October 2019

 

TECHNIQUE:  AP portable chest image was obtained 12/19/2020 3:57 pm .

 

FINDINGS:  Interstitial pattern is similar to comparison. No peripheral mass or consolidation. Bilate
ral pericardial fat noted. Heart and vasculature are normal. No measurable pleural effusion and no pn
eumothorax. No acute bony abnormality seen. No acute aortic findings suspected.

 

IMPRESSION:  No acute cardiopulmonary process.

 

Portable technique is limiting. Mild ground-glass opacities associated with COVID-19 pneumonia potent
ially obscured.

## 2020-12-19 NOTE — EDPHYS
Physician Documentation                                                                           

 [facility]                                                                 

Name: Zoltan Langston                                                                              

Age: 65 yrs                                                                                       

Sex: Male                                                                                         

: 1955                                                                                   

MRN: [id number]                                                                                   

Arrival Date: 2020                                                                          

Time: 14:17                                                                                       

Account#: [id number]                                                                            

Bed 7                                                                                             

Private MD:                                                                                       

ED Physician Casey Espinoza                                                                         

HPI:                                                                                              

                                                                                             

14:54 This 65 yrs old  Male presents to ER via Ambulatory with complaints of Blood   cp  

      Pressure Problem, Low Back Pain, Covid+.                                                    

14:55 The patient or guardian reports cough, that is intermittent.                            cp  

14:55 Onset: The symptoms/episode began/occurred 1 week(s) ago. Associated signs and          cp  

      symptoms: Pertinent negatives: chest pain, diarrhea, fever, vomiting. Severity of           

      symptoms: in the emergency department the symptoms are unchanged despite home               

      interventions. Wife reports patient's blood pressure has been low today. Has been           

      taking blood pressure with home machine and systolic pressure has been as low as 106        

      and diastolic pressure as low as 55.                                                        

                                                                                                  

Historical:                                                                                       

- Allergies:                                                                                      

14:47 No Known Allergies;                                                                     sv  

- PMHx:                                                                                           

14:47 Hypertension;                                                                           sv  

- PSHx:                                                                                           

14:47 None;                                                                                   sv  

                                                                                                  

- Immunization history:: Adult Immunizations.                                                     

- Social history:: Smoking status: .                                                              

                                                                                                  

                                                                                                  

ROS:                                                                                              

15:00 Constitutional: Negative for body aches, chills, fever, poor PO intake.                 cp  

15:00 Eyes: Negative for injury, pain, redness, and discharge.                                cp  

15:00 ENT: Negative for ear pain, sore throat, difficulty swallowing, difficulty handling         

      secretions.                                                                                 

15:00 Cardiovascular: Negative for chest pain, edema, palpitations.                               

15:00 Respiratory: Positive for cough, shortness of breath, at rest.                              

15:00 Abdomen/GI: Negative for abdominal pain, nausea, vomiting, and diarrhea.                    

15:00 Neuro: Negative for altered mental status, headache, syncope, weakness.                     

15:00 All other systems are negative.                                                             

                                                                                                  

Exam:                                                                                             

15:05 Constitutional: The patient appears in no acute distress, alert, awake,                 cp  

      non-diaphoretic, non-toxic, well developed, well nourished.                                 

15:05 Head/Face:  Normocephalic, atraumatic.                                                  cp  

15:05 Eyes: Periorbital structures: appear normal, Conjunctiva: normal, no exudate, no            

      injection, Lids and lashes: appear normal, bilaterally.                                     

15:05 ENT: External ear(s): are unremarkable, Nose: is normal, Mouth: Lips: moist, Oral           

      mucosa: moist, Posterior pharynx: Airway: no evidence of obstruction, patent.               

15:05 Neck: ROM/movement: is normal, is supple, without pain, no range of motions limitations.    

15:05 Chest/axilla: Inspection: normal, Palpation: is normal, no crepitus, no tenderness.         

15:05 Cardiovascular: Rate: normal, Rhythm: regular, Edema: is not appreciated, JVD: is not       

      appreciated.                                                                                

15:05 Respiratory: the patient does not display signs of respiratory distress,  Respirations:     

      labored breathing, that is mild, Breath sounds: bronchial sounds, that are mild, are        

      heard diffusely, decreased breath sounds, that are mild, throughout, stridor, is not        

      appreciated, wheezing: is not appreciated.                                                  

15:05 Abdomen/GI: Inspection: abdomen appears normal, Palpation: abdomen is soft and              

      non-tender, in all quadrants.                                                               

15:05 Neuro: Orientation: to person, place \T\ time. Mentation: is normal, Cerebellar function:   

      is grossly normal, Motor: moves all fours, strength is normal, Sensation: is normal.        

15:18 ECG was reviewed by the Attending Physician.                                            cp  

                                                                                                  

Vital Signs:                                                                                      

14:45  / 83; Pulse 87; Resp 24; Temp 98.7(O); Pulse Ox 95% on R/A;                      sv  

15:36  / 80 Sitting; Pulse 77; Resp 20; Pulse Ox 92% on R/A;                            tw2 

15:36  / 88 Standing; Pulse 89; Resp 20; Pulse Ox 92% on R/A;                           tw2 

15:36  / 81 Supine; Pulse 78; Resp 20; Pulse Ox 90% on R/A;                             tw2 

15:57  / 70; Pulse 88; Resp 20; Pulse Ox 93% on R/A;                                    tw2 

16:59  / 74; Pulse 81; Resp 20; Pulse Ox 91% on R/A;                                    tw2 

17:41  / 51; Pulse 84; Resp 20; Pulse Ox 88% on R/A;                                    tw2 

19:24  / 69; Pulse 85; Resp 21; Temp 97.9(O); Pulse Ox 92% on 2 lpm NC; Pain 0/10;      lp1 

17:41 pt placed on 2L nc at this time, will continue to monitor                               tw2 

                                                                                                  

MDM:                                                                                              

14:51 Patient medically screened.                                                             cp  

17:13 Physician consultation: Jaylen Guevara MD was called at 17:14, was contacted at 17:14,   cp  

      regarding admission, to the telemetry unit. patient's condition, and will see patient       

      in ED, shortly.                                                                             

17:15 Data reviewed: vital signs, nurses notes, lab test result(s), EKG, radiologic studies,  cp  

      plain films, and as a result, I will admit patient.                                         

17:15 Differential diagnosis: bronchitis, flu, URI, pneumonia. Test interpretation: by ED     cp  

      physician or midlevel provider: ECG. Counseling: I had a detailed discussion with the       

      patient and/or guardian regarding: the historical points, exam findings, and any            

      diagnostic results supporting the discharge/admit diagnosis, lab results, radiology         

      results. Response to treatment: the patient's symptoms have mildly improved after           

      treatment.                                                                                  

                                                                                                  

                                                                                             

14:53 Order name: Basic Metabolic Panel                                                         

                                                                                             

14:53 Order name: CBC with Diff                                                                 

                                                                                             

14:53 Order name: LFT's                                                                         

                                                                                             

14:53 Order name: Magnesium                                                                   cp  

                                                                                             

14:53 Order name: PT-INR; Complete Time: 16:22                                                  

                                                                                             

14:53 Order name: Troponin (emerg Dept Use Only); Complete Time: 15:37                          

                                                                                             

15:37 Interpretation: Within normal limits: TROPED < 0.02.                                      

                                                                                             

14:53 Order name: Urine Microscopic Only                                                        

                                                                                             

14:53 Order name: Lipase; Complete Time: 15:37                                                  

                                                                                             

14:53 Order name: CK; Complete Time: 15:37                                                      

                                                                                             

14:53 Order name: Basic Metabolic Panel; Complete Time: 15:36                                 EDMS

                                                                                             

15:36 Interpretation: Normal except: ; K 3.3; BUN 23; CRE 1.45; GFR 49; CA 8.0.           

                                                                                             

14:53 Order name: CBC with Automated Diff; Complete Time: 16:22                               EDMS

                                                                                             

16:22 Interpretation: Normal except: WBC 3.0; MCV 84.1; PLT 97; LYMA 0.5.                       

                                                                                             

14:53 Order name: Liver (Hepatic) Function; Complete Time: 15:37                              EDMS

                                                                                             

15:37 Interpretation: Normal except: AST 44; BILID 0.3; ALB 3.3; GLOB 3.9; A/G 0.8.             

                                                                                             

14:53 Order name: Magnesium; Complete Time: 15:37                                             EDMS

                                                                                             

15:24 Order name: CBC Smear Scan; Complete Time: 16:22                                        EDMS

                                                                                             

14:53 Order name: EKG; Complete Time: 14:54                                                     

                                                                                             

14:53 Order name: Cardiac monitoring; Complete Time: 15:15                                      

                                                                                             

14:53 Order name: EKG - Nurse/Tech; Complete Time: 15:15                                        

                                                                                             

14:53 Order name: IV Saline Lock; Complete Time: 14:54                                          

                                                                                             

14:53 Order name: Labs collected and sent; Complete Time: 14:54                                 

                                                                                             

14:53 Order name: O2 Per Protocol; Complete Time: 14:54                                         

                                                                                             

14:53 Order name: O2 Sat Monitoring; Complete Time: 14:54                                       

                                                                                             

14:53 Order name: Urine Dipstick-Ancillary (obtain specimen); Complete Time: 17:40              

                                                                                             

14:55 Order name: Orthostatics; Complete Time: 15:40                                            

                                                                                             

15:18 Order name: XRAY Chest (1 view); Complete Time: 16:22                                     

                                                                                             

15:54 Order name: CT Stone Protocol; Complete Time: 16:58                                       

                                                                                             

16:59 Interpretation: Report reviewed.                                                          

                                                                                             

17:25 Order name: Urine Dipstick--Ancillary (enter results)                                   eb  

                                                                                                  

ECG:                                                                                              

15:18 Rate is 90 beats/min. Rhythm is regular. VT interval is normal. QRS interval is normal. cp  

      QT interval is normal. T waves are Inverted in lead aVR. Interpreted by me. Reviewed by     

      me.                                                                                         

                                                                                                  

Administered Medications:                                                                         

15:14 Drug: NS 0.9% 500 ml Route: IV; Rate: bolus; Site: right antecubital;                   ll1 

15:35 Drug: SOLU-Medrol 80 mg Route: IVP; Site: right antecubital;                            tw2 

15:53 Follow up: Response: No adverse reaction                                                tw2 

15:40 Drug: Albuterol HFA Inhaler 2 puffs Route: Inhalation;                                  tw2 

15:56 Drug: Potassium Effervescent Tablet 50 mEq Route: PO;                                   tw2 

17:21 Follow up: Response: No adverse reaction                                                tw2 

                                                                                                  

                                                                                                  

Disposition:                                                                                      

                                                                                             

07:33 Co-signature as Attending Physician, Casey Espinoza MD.                                    rn  

                                                                                                  

Disposition:                                                                                      

20 17:15 Hospitalization ordered by Jaylen Guevara for Observation. Preliminary             

  diagnosis are Coronavirus infection, unspecified, Hypoxemia.                                    

- Bed requested for Telemetry/MedSurg (observation).                                              

- Status is Observation.                                                                      lp1 

- Condition is Stable.                                                                            

- Problem is new.                                                                                 

- Symptoms have improved.                                                                         

                                                                                                  

                                                                                                  

                                                                                                  

Signatures:                                                                                       

Dispatcher [employer]                           EDBrittney Kim, RN                    RN                                                      

Janelle Khan RN                        KALA   dw                                                   

Casey Espinoza MD MD rn Pena, Laura RN                         RN   lp1                                                  

Enrike Lim PA PA cp Wise, Tara, RN RN   tw2                                                  

Magdi Schmitz RN                       RN   ll1                                                  

                                                                                                  

Corrections: (The following items were deleted from the chart)                                    

                                                                                             

15:36 15:36 Normal except: ; K 3.3; BUN 23; CRE 1.45; GFR 49. cp                        cp  

16:22 15:37 Normal except: WBC 3.0; MCV 84.1; PLT 97. cp                                      cp  

18:52 17:15 Hospitalization Ordered by Jaylen Guevara MD for Observation. Preliminary          dw  

      diagnosis is Coronavirus infection, unspecified; Hypoxemia. Bed requested for               

      Telemetry/MedSurg (observation). Status is Observation. Condition is Stable. Problem is     

      new. Symptoms have improved. cp                                                             

19:52 18:52 2020 17:15 Hospitalization Ordered by Jaylen Guevara MD for Observation.     lp1 

      Preliminary diagnosis is Coronavirus infection, unspecified; Hypoxemia. Bed requested       

      for Telemetry/MedSurg (observation). Status is Observation. Condition is Stable.            

      Problem is new. Symptoms have improved. dw                                                  

                                                                                                  

**************************************************************************************************

## 2020-12-20 LAB
ALBUMIN SERPL BCP-MCNC: 2.9 G/DL (ref 3.4–5)
ALP SERPL-CCNC: 59 U/L (ref 45–117)
ALT SERPL W P-5'-P-CCNC: 43 U/L (ref 12–78)
AST SERPL W P-5'-P-CCNC: 43 U/L (ref 15–37)
BUN BLD-MCNC: 23 MG/DL (ref 7–18)
CRP SERPL-MCNC: 19.6 MG/L (ref ?–3)
FERRITIN SERPL-MCNC: 431.1 NG/ML (ref 26–388)
GLUCOSE SERPLBLD-MCNC: 163 MG/DL (ref 74–106)
HCT VFR BLD CALC: 41.2 % (ref 39.6–49)
LYMPHOCYTES # SPEC AUTO: 0.2 K/UL (ref 0.7–4.9)
PMV BLD: 10.4 FL (ref 7.6–11.3)
POTASSIUM SERPL-SCNC: 4.1 MMOL/L (ref 3.5–5.1)
RBC # BLD: 4.86 M/UL (ref 4.33–5.43)

## 2020-12-20 RX ADMIN — METHYLPREDNISOLONE SODIUM SUCCINATE SCH MG: 125 INJECTION, POWDER, FOR SOLUTION INTRAMUSCULAR; INTRAVENOUS at 20:06

## 2020-12-20 RX ADMIN — SODIUM CHLORIDE SCH MLS: 0.9 INJECTION, SOLUTION INTRAVENOUS at 06:05

## 2020-12-20 RX ADMIN — METHYLPREDNISOLONE SODIUM SUCCINATE SCH MG: 40 INJECTION, POWDER, FOR SOLUTION INTRAMUSCULAR; INTRAVENOUS at 08:28

## 2020-12-20 RX ADMIN — Medication SCH ML: at 21:00

## 2020-12-20 RX ADMIN — SODIUM CHLORIDE SCH MLS: 0.9 INJECTION, SOLUTION INTRAVENOUS at 09:47

## 2020-12-20 RX ADMIN — HEPARIN SODIUM SCH UNIT: 5000 INJECTION, SOLUTION INTRAVENOUS; SUBCUTANEOUS at 00:30

## 2020-12-20 RX ADMIN — Medication SCH MG: at 08:27

## 2020-12-20 RX ADMIN — ZINC SULFATE CAP 220 MG (50 MG ELEMENTAL ZN) SCH MG: 220 (50 ZN) CAP at 08:27

## 2020-12-20 RX ADMIN — Medication SCH ML: at 08:29

## 2020-12-20 RX ADMIN — SODIUM CHLORIDE SCH MLS: 0.9 INJECTION, SOLUTION INTRAVENOUS at 18:06

## 2020-12-20 RX ADMIN — HEPARIN SODIUM SCH UNIT: 5000 INJECTION, SOLUTION INTRAVENOUS; SUBCUTANEOUS at 18:06

## 2020-12-20 RX ADMIN — HEPARIN SODIUM SCH UNIT: 5000 INJECTION, SOLUTION INTRAVENOUS; SUBCUTANEOUS at 08:27

## 2020-12-20 NOTE — P.PN
Subjective


Date of Service: 12/20/20


Chief Complaint: Shortness of breath, hypertension


Subjective: No new changes, No C/O voiced (Feeling better  denies any chest pain

or shortness of breath)





Review of Systems


10-point ROS is otherwise unremarkable





Physical Examination





- Vital Signs


Temperature: 96.8 F


Blood Pressure: 113/58


Pulse: 68


Respirations: 19


Pulse Ox (%): 96





- Physical Exam


General: Alert, In no apparent distress


HEENT: Atraumatic, Normocephalic


Neck: Supple, 2+ carotid pulse no bruit


Respiratory: Normal air movement, Crackles/rales


Cardiovascular: Normal pulses, Regular rate/rhythm


Capillary refill: <2 Seconds


Gastrointestinal: Soft and benign, Non-distended, W/out hepatosplenomegaly


Musculoskeletal: No clubbing, No swelling


Integumentary: No rashes


Neurological: Normal speech, Normal strength at 5/5 x4 extr


Lymphatics: No axilla or inguinal lymphadenopathy





- Studies


Laboratory Data (last 24 hrs)





12/19/20 15:00: PT 12.4, INR 1.05


12/19/20 15:00: WBC 3.0 L, Hgb 14.2, Hct 42.2, Plt Count 97 L


12/19/20 15:00: Sodium 135 L, Potassium 3.3 L, BUN 23 H, Creatinine 1.45 H, 

Glucose 99, Magnesium 2.4, Total Bilirubin 0.6, AST 44 H, ALT 45, Alkaline 

Phosphatase 62, Lipase 238








Assessment & Plan





- Problems (Diagnosis)


(1) Acute respiratory failure with hypoxia


Current Visit: Yes   Status: Acute   





(2) COVID-19


Current Visit: Yes   Status: Acute   


Physician Review Additional Text: 





COVID 19 pneumonia


Acute hypoxic respiratory failure


Hypotension


Possible sepsis


History of hypertension


Hypernatremia


Hypokalemia


Acute renal insufficiency


Leukopenia








Feeling slightly better 


Monitor under telemetry


Oxygen supplementation to be continued to titrate to more than pulse ox 92


Blood pressure is stable now


Steroids IV


IV antibiotics to cover for CAP


Correct electrolytes


CT of the chest with contrast to rule out PE , Was negative for PE 


will get a pulmonology consult


GI/DVT prophylaxis


Advanced directives full code for now





Time Spent Managing Pts Care (In Minutes): 42

## 2020-12-21 VITALS — TEMPERATURE: 98.7 F | SYSTOLIC BLOOD PRESSURE: 119 MMHG | DIASTOLIC BLOOD PRESSURE: 86 MMHG

## 2020-12-21 VITALS — OXYGEN SATURATION: 96 %

## 2020-12-21 LAB
CRP SERPL-MCNC: 11.5 MG/L (ref ?–3)
FERRITIN SERPL-MCNC: 486 NG/ML (ref 26–388)
HCT VFR BLD CALC: 41.6 % (ref 39.6–49)
LYMPHOCYTES # SPEC AUTO: 0.4 K/UL (ref 0.7–4.9)
MORPHOLOGY BLD-IMP: (no result)
PMV BLD: 10.3 FL (ref 7.6–11.3)
RBC # BLD: 4.95 M/UL (ref 4.33–5.43)

## 2020-12-21 RX ADMIN — HEPARIN SODIUM SCH UNIT: 5000 INJECTION, SOLUTION INTRAVENOUS; SUBCUTANEOUS at 00:23

## 2020-12-21 RX ADMIN — SODIUM CHLORIDE SCH MLS: 0.9 INJECTION, SOLUTION INTRAVENOUS at 07:41

## 2020-12-21 RX ADMIN — Medication SCH: at 07:41

## 2020-12-21 RX ADMIN — METHYLPREDNISOLONE SODIUM SUCCINATE SCH MG: 125 INJECTION, POWDER, FOR SOLUTION INTRAMUSCULAR; INTRAVENOUS at 07:40

## 2020-12-21 RX ADMIN — ZINC SULFATE CAP 220 MG (50 MG ELEMENTAL ZN) SCH MG: 220 (50 ZN) CAP at 07:41

## 2020-12-21 RX ADMIN — HEPARIN SODIUM SCH UNIT: 5000 INJECTION, SOLUTION INTRAVENOUS; SUBCUTANEOUS at 07:41

## 2020-12-21 RX ADMIN — SODIUM CHLORIDE SCH MLS: 0.9 INJECTION, SOLUTION INTRAVENOUS at 03:42

## 2020-12-21 RX ADMIN — Medication SCH MG: at 07:41

## 2020-12-21 RX ADMIN — SODIUM CHLORIDE SCH: 0.9 INJECTION, SOLUTION INTRAVENOUS at 10:00

## 2020-12-21 NOTE — P.DS
Admission Date: 12/19/20


Discharge Date: 12/21/20


Primary Care Provider: Dr. Degroot


Disposition: ROUTINE DISCHARGE


Discharge Condition: GOOD


Reason for Admission: Shortness of breath, hypotension


Consultations: 





Pulmonary-Dr. Bettencourt





Procedures: 





Ct chest: 


FINDINGS:  Pulmonary arterial tree contrast density is not optimal. This limits 

the far peripheral branch assessment. No central pulmonary emboli are present. 

Far peripheral branch embolic disease is unlikely. 


The aorta as imaged shows no acute or suspicious finding. No pericardial 

thickening or effusion. 


Scarring changes are present in the lung parenchyma. Emphysema is seen. No acute

lung parenchymal process suspected. No pleural effusion or pleural thickening. 


No mediastinal or hilar suspicious masses. No chest wall masses or abnormal 

axillary lymphadenopathy. 


IMPRESSION:  Exam was suboptimal but no pulmonary emboli identifiable. 


No acute or emergent finding seen.





Ct Ab:


FINDINGS:  No hydronephrosis is present and no obstructing ureteral calculi. No 

suspicious renal masses. Isodense masses and pyelonephritis are not excluded on 

a stone protocol CT scan. An exophytic 3.5 centimeter mass lateral upper pole 

right kidney is most likely an incidental cyst. No significant adrenal finding. 

No urinary bladder suspicious finding. 


Imaged portions of the liver, spleen and pancreas show no suspicious findings on

non-contrast imaging. Liver is mildly fatty infiltrated. No gallbladder or 

biliary tree abnormality identified. 


No acute bowel findings. Appendix is normal. Prominent diverticulosis noted in a

tortuous and redundant sigmoid colon. No acute GI process seen. 


No mass or bulky lymphadenopathy. Patient has a very small 10 mm umbilical 

hernia along with bilateral fat filled inguinal hernias. No free air, free fluid

or inflammatory stranding. 


No significant bony abnormality. 


IMPRESSION:  Noncontrast CT abdomen and pelvis imaging showing no acute or em

ergent finding. 


Isodense masses and pyelonephritis are not excluded on stone protocol technique.




No acute GI process.





Medical Problem List: 


COVID 19 pneumonia with mild acute respiratory failure/hypoxia 


Acute renal insufficiency likely acute on chronic renal disease stage III


Leukopenia with thrombocytopenia likely related to above


CT showing 3.5 cm mass to the right upper kidney likely incidental cyst


Mild fatty liver


Hypotension with history of hypertension


Brief History of Present Illness: 





65-year-old  male presented to the emergency room with shortness of 

breath and mild low blood pressure.  Patient was evaluated the emergency room.  

Patient was found to be hypoxic.  CT scan of the chest unremarkable.  CT abdomen

showed no acute findings.  Leukopenia and thrombocytopenia likely related to 

COVID 19. Patient admitted for further evaluation and treatment.  Patient was 

recently diagnosis with COVID 19.





Hospital Course: 





Patient presented with shortness of breath.  Patient had COVID 19 pneumonia with

mild acute respiratory failure/hypoxia.  Patient was admitted for further evalu

ation and treatment.  Acute renal insufficiency, leukopenia with 

thrombocytopenia, and were noted. All likely related to COVID 19 pneumonia.  

Suspect underlying acute on chronic renal disease stage III.  Pulmonology was 

consulted.  During the course of his stay patient received IV steroids with 

improvement.  Patient with history of hypertension.  Losartan and 

hydrochlorothiazide were discontinued during his stay.  Blood pressures have 

been stable without medication at this time.  At discharge patient requiring 

home oxygen.  Currently on 2 L per nasal cannula.  Renal function appears to be 

at baseline.  At discharge patient will continue with home oxygen to maintain 

sats above 93%.  This can be weaned off with the help of his PCP or pulmonology.

 At discharge patient will continue with aspirin 81 mg daily.  The patient will 

also continue with prednisone 20 mg 1 pill twice daily for 7 days then 10 mg 1 

pill twice daily for 7 days.  Patient will continue with supplementation 

including vitamin-C, vitamin-D, thiamine.  Recommend follow up with PCP in 1-2 

weeks to follow up this hospitalization.  Recommend to recheck lab-CBC in 1-2 

weeks to monitors progress.





As mentioned above patient with acute renal insufficiency.  Previous lab 

reviewed.  Patient was hydrated.  Blood pressure medication including losartan 

hydrochlorothiazide were held during his stay.  Blood pressures have remained 

stable.  Patient likely with underlying acute on chronic renal disease stage 

III.  As mentioned above patient not requiring any blood pressure medication at 

this time.  Losartan and hydrochlorothiazide have been discontinued at 

discharge.  Recommend to monitor his blood pressure daily.  He is to keep a 

blood pressure log.  If his blood pressures are consistently above 140/90, 

losartan can be restarted.  Recommend follow up with PCP to further monitor and 

address.  Patient would benefit with nephrology evaluation as an outpatient.  

Recommend no excessive use of nonsteroidal anti-inflammatories.  Future 

medications may need to be renally dose.  Recommend to recheck lab-BMP in 1-2 

weeks to monitors progress.





CT scan revealed 3.5 cm mass to the right upper kidney.  This is likely 

incidental cysts.  Recommend renal ultrasound as an outpatient to further 

address.  His PCP can help with this.





Patient noted to have fatty liver.  Education provided.








Vital Signs/Physical Exam: 














Temp Pulse Resp BP Pulse Ox


 


 98.4 F   78   18   117/70   93 


 


 12/21/20 08:00  12/21/20 08:00  12/21/20 08:00  12/21/20 08:00  12/21/20 08:00








General: Alert, In no apparent distress, Oriented x3, Cooperative


HEENT: Atraumatic


Neck: Supple


Respiratory: Clear to auscultation bilaterally, Normal air movement


Cardiovascular: Normal pulses, Regular rate/rhythm


Gastrointestinal: Normal bowel sounds


Musculoskeletal: No erythema, No tenderness, No warmth


Neurological: Normal speech, Normal strength at 5/5 x4 extr, Normal tone, Normal

affect


Laboratory Data at Discharge: 














WBC  8.8 K/uL (4.3-10.9)  D 12/21/20  06:53    


 


Hgb  14.1 g/dL (13.6-17.9)   12/21/20  06:53    


 


Hct  41.6 % (39.6-49.0)   12/21/20  06:53    


 


Plt Count  100 K/uL (152-406)  L  12/21/20  06:53    


 


PT  12.4 SECONDS (9.5-12.5)   12/19/20  15:00    


 


INR  1.05   12/19/20  15:00    


 


Sodium  137 mmol/L (136-145)   12/20/20  05:17    


 


Potassium  4.1 mmol/L (3.5-5.1)   12/20/20  05:17    


 


BUN  23 mg/dL (7-18)  H  12/20/20  05:17    


 


Creatinine  1.43 mg/dL (0.55-1.3)  H  12/20/20  05:17    


 


Glucose  163 mg/dL ()  H  12/20/20  05:17    


 


Magnesium  2.4 mg/dL (1.8-2.4)   12/19/20  15:00    


 


Total Bilirubin  0.6 mg/dL (0.2-1.0)   12/20/20  05:17    


 


AST  43 U/L (15-37)  H  12/20/20  05:17    


 


ALT  43 U/L (12-78)   12/20/20  05:17    


 


Alkaline Phosphatase  59 U/L ()   12/20/20  05:17    


 


Lipase  238 U/L ()   12/19/20  15:00    








Home Medications: 








Losartan Potassium [Cozaar] 50 mg PO DAILY 12/19/20 


hydroCHLOROthiazide [Hydrochlorothiazide*] 12.5 mg PO DAILY 12/19/20 


Ascorbic Acid [Vitamin C*] 500 mg PO TID #90 tablet 12/21/20 


Aspirin [Aspirin EC 81 MG] 81 mg PO DAILY #90 tablet. 12/21/20 


Cholecalciferol (Vitamin D3) [Vitamin D 1000 Iu Tab] 2,000 unit PO DAILY #60 tab

12/21/20 


Thiamine HCl 100 mg PO DAILY #30 tablet 12/21/20 


predniSONE [Prednisone*] 20 mg PO SEECOM #21 tab 12/21/20 





New Medications: 


Aspirin [Aspirin EC 81 MG] 81 mg PO DAILY #90 tablet.


predniSONE [Prednisone*] 20 mg PO SEECOM #21 tab


Thiamine HCl 100 mg PO DAILY #30 tablet


Ascorbic Acid [Vitamin C*] 500 mg PO TID #90 tablet


Cholecalciferol (Vitamin D3) [Vitamin D 1000 Iu Tab] 2,000 unit PO DAILY #60 tab


Patient Discharge Instructions: 1.  Recommend follow up with PCP in 1 week to 

follow up this hospitalization.  2.  Patient presented with shortness of breath.

 Patient had COVID 19 pneumonia with mild acute respiratory failure/hypoxia.  

Patient was admitted for further evaluation and treatment.  Acute renal 

insufficiency, leukopenia with thrombocytopenia, and were noted. All likely 

related to COVID 19 pneumonia.  Suspect underlying acute on chronic renal 

disease stage III.  Pulmonology was consulted.  During the course of his stay 

patient received IV steroids with improvement.  Patient with history of 

hypertension.  Losartan and hydrochlorothiazide were discontinued during his 

stay.  Blood pressures have been stable without medication at this time.  At 

discharge patient requiring home oxygen.  Currently on 2 L per nasal cannula.  

Renal function appears to be at baseline.  At discharge patient will continue 

with home oxygen to maintain sats above 93%.  This can be weaned off with the 

help of his PCP or pulmonology.  At discharge patient will continue with aspirin

81 mg daily.  The patient will also continue with prednisone 20 mg 1 pill twice 

daily for 7 days then 10 mg 1 pill twice daily for 7 days.  Patient will 

continue with supplementation including vitamin-C, vitamin-D, thiamine.  

Recommend follow up with PCP in 1-2 weeks to follow up this hospitalization.  

Recommend to recheck lab-CBC in 1-2 weeks to monitors progress.  3.  As 

mentioned above patient with acute renal insufficiency.  Previous lab reviewed. 

Patient was hydrated.  Blood pressure medication including losartan 

hydrochlorothiazide were held during his stay.  Blood pressures have remained 

stable.  Patient likely with underlying acute on chronic renal disease stage 

III.  As mentioned above patient not requiring any blood pressure medication at 

this time.  Losartan and hydrochlorothiazide have been discontinued at 

discharge.  Recommend to monitor his blood pressure daily.  He is to keep a 

blood pressure log.  If his blood pressures are consistently above 140/90, 

losartan can be restarted.  Recommend follow up with PCP to further monitor and 

address.  Patient would benefit with nephrology evaluation as an outpatient.  

Recommend no excessive use of nonsteroidal anti-inflammatories.  Future 

medications may need to be renally dose.  Recommend to recheck lab-BMP in 1-2 

weeks to monitors progress.  4.  CT scan revealed 3.5 cm mass to the right upper

kidney.  This is likely incidental cysts.  Recommend renal ultrasound as an 

outpatient to further address.  His PCP can help with this.  5.  Patient noted 

to have fatty liver.  Education provided.


Diet: AHA


Activity: Ad huong


Followup: 


Alba Degroot DO [Primary Care Provider] - 


Time spent managing pt's care (in minutes): 55

## 2020-12-21 NOTE — EKG
Test Date:    2020-12-19               Test Time:    15:07:05

Technician:   HANNAH                                    

                                                     

MEASUREMENT RESULTS:                                       

Intervals:                                           

Rate:         90                                     

AL:           152                                    

QRSD:         94                                     

QT:           370                                    

QTc:          452                                    

Axis:                                                

P:            86                                     

AL:           152                                    

QRS:          -79                                    

T:            63                                     

                                                     

INTERPRETIVE STATEMENTS:                                       

                                                     

Normal sinus rhythm

Pulmonary disease pattern

Left anterior fascicular block

Abnormal ECG

Compared to ECG 10/26/2019 09:43:04

Left anterior fascicular block now present



Electronically Signed On 12-21-20 07:34:38 CST by Jeffry Olea

## 2025-02-14 ENCOUNTER — HOSPITAL ENCOUNTER (INPATIENT)
Dept: HOSPITAL 97 - ER | Age: 70
LOS: 1 days | Discharge: HOME | DRG: 190 | End: 2025-02-15
Attending: INTERNAL MEDICINE | Admitting: INTERNAL MEDICINE
Payer: COMMERCIAL

## 2025-02-14 VITALS — BODY MASS INDEX: 38 KG/M2

## 2025-02-14 DIAGNOSIS — Z79.82: ICD-10-CM

## 2025-02-14 DIAGNOSIS — N18.30: ICD-10-CM

## 2025-02-14 DIAGNOSIS — N28.1: ICD-10-CM

## 2025-02-14 DIAGNOSIS — J44.1: Primary | ICD-10-CM

## 2025-02-14 DIAGNOSIS — Z79.52: ICD-10-CM

## 2025-02-14 DIAGNOSIS — J96.01: ICD-10-CM

## 2025-02-14 DIAGNOSIS — E66.9: ICD-10-CM

## 2025-02-14 DIAGNOSIS — Z79.899: ICD-10-CM

## 2025-02-14 DIAGNOSIS — N40.0: ICD-10-CM

## 2025-02-14 DIAGNOSIS — I12.9: ICD-10-CM

## 2025-02-14 DIAGNOSIS — Z87.891: ICD-10-CM

## 2025-02-14 LAB
ALBUMIN SERPL BCP-MCNC: 3.3 G/DL (ref 3.4–5)
ALBUMIN/GLOB SERPL: 0.8 {RATIO} (ref 1.1–1.8)
ALP SERPL-CCNC: 76 U/L (ref 45–117)
ALT SERPL W P-5'-P-CCNC: 34 U/L (ref 16–61)
ANION GAP SERPL CALC-SCNC: 7.8 MEQ/L (ref 5–15)
AST SERPL W P-5'-P-CCNC: 25 U/L (ref 15–37)
BILIRUB INDIRECT SERPL-MCNC: 0.4 MG/DL (ref 0.2–0.8)
BUN BLD-MCNC: 15 MG/DL (ref 7–18)
GLOBULIN SER CALC-MCNC: 4.3 G/DL (ref 2.3–3.5)
GLUCOSE SERPLBLD-MCNC: 132 MG/DL (ref 74–106)
HCT VFR BLD CALC: 44.4 % (ref 39.6–49)
HGB BLD-MCNC: 14.9 G/DL (ref 13.6–17.9)
INR BLD: 1.14
LIPASE SERPL-CCNC: 35 U/L (ref 13–75)
LYMPHOCYTES # SPEC AUTO: 1 K/UL (ref 0.7–4.9)
MAGNESIUM SERPL-MCNC: 2.2 MG/DL (ref 1.6–2.4)
MCH RBC QN AUTO: 28.8 PG (ref 27–35)
MCHC RBC AUTO-ENTMCNC: 33.5 G/DL (ref 32–36)
MCV RBC: 86 FL (ref 80–100)
NRBC # BLD: 0 10*3/UL (ref 0–0)
NRBC BLD AUTO-RTO: 0.1 % (ref 0–0)
NT-PROBNP SERPL-MCNC: 95 PG/ML (ref ?–125)
PMV BLD: 9.7 FL (ref 7.6–11.3)
POTASSIUM SERPL-SCNC: 3.8 MEQ/L (ref 3.5–5.1)
PROTHROMBIN TIME: 12 SECONDS (ref 9.4–12.5)
RBC # BLD: 5.16 M/UL (ref 4.33–5.43)
SARS-COV+SARS-COV-2 AG RESP QL IA.RAPID: (no result)
TROPONIN I SERPL HS-MCNC: < 3 PG/ML (ref ?–58.9)
WBC # BLD AUTO: 11.7 THOU/UL (ref 4.3–10.9)

## 2025-02-14 PROCEDURE — 96365 THER/PROPH/DIAG IV INF INIT: CPT

## 2025-02-14 PROCEDURE — 84484 ASSAY OF TROPONIN QUANT: CPT

## 2025-02-14 PROCEDURE — 80076 HEPATIC FUNCTION PANEL: CPT

## 2025-02-14 PROCEDURE — 87807 RSV ASSAY W/OPTIC: CPT

## 2025-02-14 PROCEDURE — 76770 US EXAM ABDO BACK WALL COMP: CPT

## 2025-02-14 PROCEDURE — 93005 ELECTROCARDIOGRAM TRACING: CPT

## 2025-02-14 PROCEDURE — 87040 BLOOD CULTURE FOR BACTERIA: CPT

## 2025-02-14 PROCEDURE — 71045 X-RAY EXAM CHEST 1 VIEW: CPT

## 2025-02-14 PROCEDURE — 96375 TX/PRO/DX INJ NEW DRUG ADDON: CPT

## 2025-02-14 PROCEDURE — 80053 COMPREHEN METABOLIC PANEL: CPT

## 2025-02-14 PROCEDURE — 99285 EMERGENCY DEPT VISIT HI MDM: CPT

## 2025-02-14 PROCEDURE — 83880 ASSAY OF NATRIURETIC PEPTIDE: CPT

## 2025-02-14 PROCEDURE — 71275 CT ANGIOGRAPHY CHEST: CPT

## 2025-02-14 PROCEDURE — 85610 PROTHROMBIN TIME: CPT

## 2025-02-14 PROCEDURE — 80048 BASIC METABOLIC PNL TOTAL CA: CPT

## 2025-02-14 PROCEDURE — 87811 SARS-COV-2 COVID19 W/OPTIC: CPT

## 2025-02-14 PROCEDURE — 85025 COMPLETE CBC W/AUTO DIFF WBC: CPT

## 2025-02-14 PROCEDURE — 83690 ASSAY OF LIPASE: CPT

## 2025-02-14 PROCEDURE — 87804 INFLUENZA ASSAY W/OPTIC: CPT

## 2025-02-14 PROCEDURE — 96372 THER/PROPH/DIAG INJ SC/IM: CPT

## 2025-02-14 PROCEDURE — 83605 ASSAY OF LACTIC ACID: CPT

## 2025-02-14 PROCEDURE — 83735 ASSAY OF MAGNESIUM: CPT

## 2025-02-14 PROCEDURE — 94760 N-INVAS EAR/PLS OXIMETRY 1: CPT

## 2025-02-14 PROCEDURE — 81001 URINALYSIS AUTO W/SCOPE: CPT

## 2025-02-14 PROCEDURE — 36415 COLL VENOUS BLD VENIPUNCTURE: CPT

## 2025-02-14 RX ADMIN — IPRATROPIUM BROMIDE SCH: 0.5 SOLUTION RESPIRATORY (INHALATION) at 21:00

## 2025-02-14 RX ADMIN — AMOXICILLIN AND CLAVULANATE POTASSIUM SCH MG: 500; 125 TABLET, FILM COATED ORAL at 23:24

## 2025-02-14 RX ADMIN — SODIUM CHLORIDE SCH MLS: 0.9 INJECTION, SOLUTION INTRAVENOUS at 23:24

## 2025-02-14 NOTE — P.HP
Certification for Inpatient


With expected LOS: >2 Midnights


Practitioner: I am a practitioner with admitting privileges, knowledge of 

patient current condition, hospital course, and medical plan of care.


Services: Services provided to patient in accordance with Admission requirements

found in Title 42 Section 412.3 of the Code of Federal Regulations





Patient History


Date of Service: 02/14/25


Reason for admission: Respiratory distress


History of Present Illness: 





Patient is 69 years of age admitted with acute onset of shortness of breath he 

has been having dyspnea for the past year former smoker quit 8 years ago denies 

any chest pain no other prior history of cardiopulmonary disorders today noticed

that his sat was below 90% came to the emergency room [place] any fever chills


Allergies





No Known Allergies Allergy (Verified 12/19/20 20:55)


   





Home Medications: 








Losartan Potassium [Cozaar] 50 mg PO DAILY 12/19/20 


hydroCHLOROthiazide [Hydrochlorothiazide*] 12.5 mg PO DAILY 12/19/20 


Ascorbic Acid [Vitamin C*] 500 mg PO TID #90 tablet 12/21/20 


Aspirin [Aspirin EC 81 MG] 81 mg PO DAILY #90 tablet. 12/21/20 


Cholecalciferol (Vitamin D3) [Vitamin D 1000 Iu Tab] 2,000 unit PO DAILY #60 tab

12/21/20 


Thiamine HCl 100 mg PO DAILY #30 tablet 12/21/20 


predniSONE [Prednisone*] 20 mg PO SEECOM #21 tab 12/21/20 








- Past Medical/Surgical History


Diabetic: No


-: hypertension





- Family History


  ** Father


-: Cancer





  ** Mother


-: Cancer





- Social History


Alcohol use: No


CD- Drugs: No


Caffeine use: Yes





Review of Systems


10-point ROS is otherwise unremarkable





Physical Examination





- Vital Signs


Temperature: 98.2 F


Blood Pressure: 133/74


Pulse: 99


Respirations: 23


Pulse Ox (%): 91





- Physical Exam


General: Alert, Oriented x3


Respiratory: Clear to auscultation bilaterally


Cardiovascular: No edema, Regular rate/rhythm


Gastrointestinal: Normal bowel sounds, Soft and benign


Musculoskeletal: No clubbing, No swelling


Integumentary: No rashes, No breakdown





- Studies


Laboratory Data (last 24 hrs)











  02/14/25 02/14/25 02/14/25





  19:20 19:20 19:20


 


WBC   11.70 H 


 


Hgb   14.9 


 


Hct   44.4 


 


Plt Count   142 L 


 


PT  12.0  


 


INR  1.14  


 


Sodium    141


 


Potassium    3.8


 


BUN    15


 


Creatinine    1.62 H


 


Glucose    132 H


 


Magnesium    2.2


 


Total Bilirubin    0.6


 


AST    25


 


ALT    34


 


Alkaline Phosphatase    76


 


Lipase    35








Microbiology Data (last 24 hrs): 








02/14/25 19:20   Nasopharnyx   Influenza Type A Antigen Screen - Final


02/14/25 19:20   Nasopharnyx   Influenza Type B Antigen Screen - Final








Assessment and Plan





- Problems (Diagnosis)


(1) Acute respiratory failure with hypoxia


Current Visit: No   Status: Acute   


Plan: 


Patient is 69 years of age admitted with acute dyspnea heavy smoker quit 8 years

 ago dyspneic for the past year denies any chest pain patient's BNP is normal 

mild renal insufficiency creatinine of 1.62 white count is mildly elevated he 

has underlying obstructive airways disease to admit the patient treat with 

bronchodilators steroids  echocardiogram had a CT pulmonary angiogram official 

report is pending no obvious thromboembolism he does have COPD changes in the 

upper lobe/also ordered renal ultrasound urinalysis trial of IV fluids to see if

 his renal function improved patient is on losartan at home








- Advance Directives


Does patient have a Living Will: No


Does patient have a Durable POA for Healthcare: No

## 2025-02-14 NOTE — EDPHYS
Physician Documentation                                                                           

 [facility]                                                                 

Name: Zoltan Cevalloszjarad                                                                              

Age: 69 yrs                                                                                       

Sex: Male                                                                                         

: 1955                                                                                   

MRN: [id number]                                                                                   

Arrival Date: 2025                                                                          

Time: 18:32                                                                                       

Account#: [id number]                                                                            

Bed 5                                                                                             

Private MD:                                                                                       

ED Physician Enrike Mulligan                                                                      

HPI:                                                                                              

                                                                                             

18:54 This 69 yrs old Unknown Male presents to ER via Ambulatory with complaints of Shortness shlomo 

      Of Breath.                                                                                  

18:54 The patient has shortness of breath at rest, with light activity. Onset: The            shlomo 

      symptoms/episode began/occurred 3 day(s) ago. Duration: The symptoms are continuous,        

      and are steadily getting worse. The patient's shortness of breath is aggravated by          

      coughing, exertion, light activity. Associated signs and symptoms: Pertinent positives:     

      non-productive cough. Severity of symptoms: At their worst the symptoms were moderate       

      in the emergency department the symptoms are unchanged. The patient has experienced         

      similar episodes in the past, a few times.                                                  

                                                                                                  

Historical:                                                                                       

- Allergies:                                                                                      

18:46 No Known Allergies;                                                                     ss  

- Home Meds:                                                                                      

18:46 Hydrochlorothiazide Oral [Active]; losartan oral [Active];                              ss  

- PMHx:                                                                                           

18:46 BPH (Unknown); Hypertension;                                                            ss  

                                                                                                  

- Immunization history:: Adult Immunizations up to date.                                          

- Infectious Disease History:: Denies.                                                            

- Social history:: Smoking status: unknown.                                                       

                                                                                                  

                                                                                                  

ROS:                                                                                              

18:55 Constitutional: Negative for fever, chills, and weight loss, Eyes: Negative for injury, shlomo 

      pain, redness, and discharge, ENT: Negative for injury, pain, and discharge, Neck:          

      Negative for injury, pain, and swelling, Cardiovascular: Negative for chest pain,           

      palpitations, and edema, Abdomen/GI: Negative for abdominal pain, nausea, vomiting,         

      diarrhea, and constipation, Back: Negative for injury and pain, : Negative for            

      injury, bleeding, discharge, and swelling, MS/Extremity: Negative for injury and            

      deformity, Skin: Negative for injury, rash, and discoloration, Neuro: Negative for          

      headache, weakness, numbness, tingling, and seizure, Psych: Negative for depression,        

      anxiety, suicide ideation, homicidal ideation, and hallucinations, Allergy/Immunology:      

      Negative for hives, rash, and allergies, Endocrine: Negative for neck swelling,             

      polydipsia, polyuria, polyphagia, and marked weight changes, Hematologic/Lymphatic:         

      Negative for swollen nodes, abnormal bleeding, and unusual bruising,                        

18:55 Respiratory: Positive for cough, shortness of breath, wheezing, expiratory,                 

                                                                                                  

Exam:                                                                                             

18:55 Constitutional:  This is a well developed, well nourished patient who is awake, alert,  shlomo 

      and in no acute distress. Head/Face:  Normocephalic, atraumatic. Eyes:  Pupils equal        

      round and reactive to light, extra-ocular motions intact.  Lids and lashes normal.          

      Conjunctiva and sclera are non-icteric and not injected.  Cornea within normal limits.      

      Periorbital areas with no swelling, redness, or edema. ENT:  Nares patent. No nasal         

      discharge, no septal abnormalities noted.  Tympanic membranes are normal and external       

      auditory canals are clear.  Oropharynx with no redness, swelling, or masses, exudates,      

      or evidence of obstruction, uvula midline.  Mucous membranes moist. Neck:  Trachea          

      midline, no thyromegaly or masses palpated, and no cervical lymphadenopathy.  Supple,       

      full range of motion without nuchal rigidity, or vertebral point tenderness.  No            

      Meningismus. Chest/axilla:  Normal chest wall appearance and motion.  Nontender with no     

      deformity.  No lesions are appreciated. Cardiovascular:  Regular rate and rhythm with a     

      normal S1 and S2.  No gallops, murmurs, or rubs.  Normal PMI, no JVD.  No pulse             

      deficits. Abdomen/GI:  Soft, non-tender, with normal bowel sounds.  No distension or        

      tympany.  No guarding or rebound.  No evidence of tenderness throughout. Back:  No          

      spinal tenderness.  No costovertebral tenderness.  Full range of motion. Male :           

      Normal genitalia with no discharge or lesions. Skin:  Warm, dry with normal turgor.         

      Normal color with no rashes, no lesions, and no evidence of cellulitis. MS/ Extremity:      

      Pulses equal, no cyanosis.  Neurovascular intact.  Full, normal range of motion.,           

      bilateral aka Neuro:  Awake and alert, GCS 15, oriented to person, place, time, and         

      situation.  Cranial nerves II-XII grossly intact.  Motor strength 5/5 in all                

      extremities.  Sensory grossly intact.  Cerebellar exam normal.  Normal gait. Psych:         

      Awake, alert, with orientation to person, place and time.  Behavior, mood, and affect       

      are within normal limits.                                                                   

18:55 Respiratory: mild respiratory distress is noted,  Respirations: labored breathing, that     

      is moderate, Breath sounds: bronchial sounds, that are mild, are scattered, decreased       

      breath sounds, that are moderate, are scattered, rhonchi, that are mild, stridor, is        

      not appreciated, wheezing: expiratory is scattered,                                         

18:55 Musculoskeletal/extremity: DVT Exam: No signs of deep vein thrombosis. no pain, no          

      swelling, no tenderness, negative Homans' sign noted on exam, no appreciated bluish         

      discoloration, no erythema, no increased warmth,                                            

19:00 ECG was reviewed by the Attending Physician.                                            [facility] 

20:01 ECG was reviewed by the Attending Physician.                                            [facility] 

                                                                                                  

Vital Signs:                                                                                      

18:43  / 103; Pulse 99; Resp 24; Temp 98.2(O); Pulse Ox 89% on R/A; Pain 0/10;          ss  

20:40  / 97; Pulse 113; Resp 20; Pulse Ox 95% ;                                         kj2 

22:03 Pulse 111; Resp 20; Temp 98.2; Pulse Ox 95% on 2 lpm NC; Pain 3/10;                     kj2 

18:43 Pain Scale: Adult                                                                       ss  

22:03 Pain Scale: Adult                                                                       kj2 

                                                                                                  

Hallie Coma Score:                                                                               

19:38 Eye Response: spontaneous(4). Motor Response: obeys commands(6). Verbal Response:       kj2 

      oriented(5). Total: 15.                                                                     

22:03 Eye Response: spontaneous(4). Motor Response: obeys commands(6). Verbal Response:       kj2 

      oriented(5). Total: 15.                                                                     

                                                                                                  

MDM:                                                                                              

18:45 Medical Screening Exam initiated                                                        shlomo 

18:57 Differential diagnosis: Anxiety Reaction asthma, CHF exacerbation, Chronic Obstructive  shlomo 

      Pulmonary Disease obstructed airway, bronchitis, flu, URI, Myocardial Infarction            

      pneumonia, pulmonary edema, Pulmonary Embolism reactive airway disease, Sepsis Unstable     

      Angina. Antibiotic administration: Rocephin and Zithromax given. Immunization status:       

      Pneumococcal vaccine: within last 5 years. Influenza vaccine: within last 5 years. Data     

      reviewed: vital signs, nurses notes, lab test result(s), EKG, radiologic studies, CT        

      scan, plain films. Consideration of Admission/Observation Patient was admitted/placed       

      on observation. Escalation of care including admission/observation considered. I            

      considered the following discharge prescriptions or medication management in the            

      emergency department Medications were administered in the Emergency Department. See         

      MAR. Test considered but Not performed: Ultrasound NO 2 D ECHO. Historians other than       

      the Patient: Spouse/Significant Other: WIFE WELL INFORMED. Care significantly affected      

      by the following chronic conditions: Hypertension, Chronic Obstructive Pulmonary            

      Disease, Obesity. Counseling: I had a detailed discussion with the patient and/or           

      guardian regarding the historical points, exam findings, and any diagnostic results         

      supporting the discharge/admit diagnosis, the presence of at least one elevated blood       

      pressure reading (>120/80) during this emergency department visit, lab results,             

      radiology results, the need for further work-up and treatment in the hospital.              

                                                                                                  

                                                                                             

18:47 Order name: Basic Metabolic Panel; Complete Time: 20:21                                 shlomo 

                                                                                             

18:47 Order name: CBC with Diff; Complete Time: 19:56                                                                                                                                      

18:47 Order name: LFT's; Complete Time: 20:21                                                                                                                                              

18:47 Order name: Magnesium; Complete Time: 20:21                                                                                                                                          

18:47 Order name: NT PRO-BNP; Complete Time: 20:21                                                                                                                                         

18:47 Order name: PT-INR; Complete Time: 19:56                                                                                                                                             

18:47 Order name: Troponin HS; Complete Time: 20:21                                                                                                                                        

18:47 Order name: Blood Culture Adult (2)                                                                                                                                                  

18:47 Order name: Lipase; Complete Time: 20:21                                                                                                                                             

18:47 Order name: Lactate w/ 2H reflex if indic.; Complete Time: 19:56                                                                                                                     

18:47 Order name: SARS RAPID; Complete Time: 20:21                                                                                                                                         

18:47 Order name: Flu; Complete Time: 20:21                                                                                                                                                

18:47 Order name: Urinalysis w/ reflexes                                                                                                                                                   

20:33 Order name: CBC with Automated Diff                                                     EDMS

                                                                                             

20:33 Order name: CBC with Automated Diff                                                     EDMS

                                                                                             

20:33 Order name: Comprehensive Metabolic Panel                                               EDMS

                                                                                             

20:33 Order name: Comprehensive Metabolic Panel                                               EDMS

                                                                                             

18:47 Order name: XRAY Chest (1 view)                                                                                                                                                      

18:54 Order name: CT Chest For PE Angio                                                                                                                                                    

18:47 Order name: EKG; Complete Time: 18:48                                                                                                                                                

18:47 Order name: Cardiac monitoring; Complete Time: 19:55                                                                                                                                 

18:47 Order name: EKG - Nurse/Tech; Complete Time: 19:18                                      [facility] 

                                                                                             

18:47 Order name: IV Saline Lock; Complete Time:                                         [facility] 

                                                                                             

18:47 Order name: Labs collected and sent; Complete Time:                                [facility] 

                                                                                             

18:47 Order name: O2 Per Protocol; Complete Time:                                        [facility] 

                                                                                             

18:47 Order name: O2 Sat Monitoring; Complete Time: :                                     [facility] 

                                                                                                  

EC:01 Rate is 919 beats/min. Rhythm is regular. QRS Axis is Normal. WI interval is normal.    [facility] 

      QRS interval is normal. QT interval is normal. No Q waves. T waves are Normal. No ST        

      changes noted. Clinical impression: NSR w/ Non-specific ST/T Changes and No evidence of     

      ischemia. Interpreted by me. Reviewed by me.                                                

                                                                                                  

Administered Medications:                                                                         

19:54 Drug: Lovenox Sub-Q 40 mg Sub-Q once Route: Sub-Q; Site: abdomen;                       kj2 

22:04 Follow up: Response: No adverse reaction                                                kj2 

19:54 Drug: Famotidine IVP 20 mg IVP once; dilute with 10 mL 0.9% NaCl; give over 2 minutes   kj2 

      Route: IVP; Site: right antecubital;                                                        

22:04 Follow up: Response: No adverse reaction                                                kj2 

19:54 Drug: Magnesium Sulfate IVPB 2 grams IVPB once over 2 hrs Route: IVPB; Infused Over: 2  kj2 

      hrs; Site: right antecubital;                                                               

22:04 Follow up: Response: No adverse reaction; IV Status: Completed infusion; IV Intake: [id number] 

19:55 Drug: NS 0.9% IV 1000 ml IV at 125 ml/hr once Route: IV; Rate: 125 ml/hr; Site: right   [facility] 

      antecubital;                                                                                

22:07 Follow up: Response: No adverse reaction; IV Status: Completed infusion                 kj2 

19:55 Drug: Levalbuterol Inhalation 2.5 mg Inhalation once Route: Inhalation;                 kj2 

22:05 Follow up: Response: No adverse reaction                                                kj2 

19:55 Drug: Ipratropium Inhalation Aerosol 0.5 mg Inhalation once Route: Inhalation;          kj2 

22:05 Follow up: Response: No adverse reaction                                                kj2 

19:55 Drug: MethylPrednisoLONE  mg IVP once Route: IVP; Site: right antecubital;       kj2 

22:05 Follow up: Response: No adverse reaction                                                kj2 

19:55 Drug: Levalbuterol Inhalation 1.25 mg Inhalation once Route: Inhalation;                kj2 

22:05 Follow up: Response: No adverse reaction                                                kj2 

20:12 Drug: Rocephin IV 1 grams IV at per protocol once; Given slow IV push per pharmacy      kj2 

      instructions Route: IV; Rate: per protocol; Site: right antecubital;                        

22:04 Follow up: Response: No adverse reaction; IV Status: Completed infusion; IV Intake: [id number] 

20:12 Drug: AZITHromycin  mg PO once Route: PO;                                         kj2 

22:04 Follow up: Response: No adverse reaction                                                kj2 

21:18 Not Given (Patient Refused): Mucomyst - [id number] mg PO once                    kj2 

21:28 Drug: Levalbuterol Inhalation 2.5 mg Inhalation once Route: Inhalation;                 kj2 

22:04 Follow up: Response: No adverse reaction                                                kj2 

21:28 Drug: Decadron - Dexamethasone IVP 10 mg IVP once Route: IVP; Site: right antecubital;  kj2 

22:04 Follow up: Response: No adverse reaction                                                kj2 

                                                                                                  

                                                                                                  

Disposition Summary:                                                                              

25 19:58                                                                                    

Hospitalization Ordered                                                                           

 Notes:       Hospitalization Status: Inpatient Admission                                           
  shlomo

      Provider: Santhosh Bettencourt cha 

      Location: Telemetry/MedSurg (Inpatient)                                                 shlomo 

      Condition: Fair                                                                         shlomo 

      Problem: new                                                                            shlomo 

      Symptoms: have improved                                                                 shlomo 

      Bed/Room Type: Standard                                                                 shlomo 

      Room Assignment: 415(25 21:09)                                                    rv1 

      Diagnosis                                                                                   

        - Dyspnea                                                                             shlomo 

        - Essential (primary) hypertension                                                    shlomo 

        - Tobacco use - PAST, FORMER SMOKER                                                   shlomo 

        - COPD/ Chronic obstructive pulmonary disease with (acute) exacerbation               shlomo 

        - Obesity, unspecified                                                                shlomo 

        - Hypoxemia                                                                           shlomo 

      Forms:                                                                                      

        - Medication Reconciliation Form                                                      shlomo 

        - SBAR form                                                                           shlomo 

        - Leadership Thank You Letter                                                         shlomo 

Signatures:                                                                                       

Dispatcher [employer]                           EDEnrike Pardo MD MD cha Blanchard, Shelby, RN                   RN   Miriam Barger                            rv1                                                  

Dominga Hilario RN                     RN   kj2                                                  

                                                                                                  

Corrections: (The following items were deleted from the chart)                                    

18:48 18:48 BASIC METABOLIC PANEL+C.LAB.BRZ ordered. EDMS                                     EDMS

18:48 18:48 CBC+H.LAB.BRZ ordered. EDMS                                                       EDMS

18:48 18:48 HEPATIC FUNCTION+C.LAB.BRZ ordered. EDMS                                          EDMS

18:48 18:48 MAGNESIUM+C.LAB.BRZ ordered. EDMS                                                 EDMS

18:48 18:48 PROBNP+C.LAB.BRZ ordered. EDMS                                                    EDMS

18:48 18:48 PROTIME (+INR)+COAG.LAB.BRZ ordered. EDMS                                         EDMS

18:48 18:48 Troponin High Sensitivity+C.LAB.BRZ ordered. EDMS                                 EDMS

18:48 18:48 BLOOD CULTURE*+BA.LAB.BRZ ordered. EDMS                                           EDMS

18:48 18:48 LIPASE+C.LAB.BRZ ordered. EDMS                                                    EDMS

18:48 18:48 LACTATE+C.LAB.BRZ ordered. EDMS                                                   EDMS

18:48 18:48 SARS-COV-2 Antigen Rapid+I.LAB.BRZ ordered. EDMS                                  EDMS

18:48 18:48 Influenza Screen (A \T\ B)+BA.LAB.BRZ ordered. EDMS                                 EDMS

18:48 18:48 Urinalysis+U.LAB.BRZ ordered. EDMS                                                EDMS

20:02 19:00 Rate is 79 beats/min. Rhythm is regular. QRS Axis is Normal. WI interval is       shlomo 

      prolonged at 226 msec. QRS interval is normal. QT interval is normal. No Q waves. T         

      waves are Normal. No ST changes noted. Clinical impression: NSR w/ Non-specific ST/T        

      Changes and No evidence of ischemia. Interpreted by me. Reviewed by me. shlomo                 

21:09 19:58 shlomo                                                                               rv1 

                                                                                                  

**************************************************************************************************

## 2025-02-14 NOTE — RAD REPORT
Procedure: Chest Single View



HISTORY: Shortness of breath



COMPARISON: 2023



FINDINGS:



The lungs appear clear of acute infiltrate. Lungs are mildly to moderately hyperaerated.



No significant pleural effusion noted.



The heart is mildly enlarged.



IMPRESSION:



No acute abnormality is displayed.



Reported By: Jase Askew 

Electronically Signed:  2/14/2025 8:58 PM

## 2025-02-14 NOTE — RAD REPORT
EXAMINATION: CTA CHEST PE



CLINICAL INDICATION: sob



TECHNIQUE: 100 cc 370 Isovue administered intravenously. This examination was performed according to 
an angiographic protocol with 3D post-processing. This involves 3D reconstructions, MIPs, volume

rendered images and/or shaded surface rendering. One or more of the following dose reduction techniqu
es were used: Automated exposure control, adjustment of the mA and/or kV according to patient size,

and/or iterative reconstruction. Unless otherwise specified, incidental findings do not require dedic
ated imaging follow-up. [id number]. 



COMPARISON: 2020.



FINDINGS:





A pulmonary embolus is not seen.



4.6 cm aneurysm ascending aorta.



No pleural effusion. No pericardial effusion.



Lungs are clear. Mild to moderate COPD



IMPRESSION: 



No evidence of a pulmonary embolism



4.6 cm aneurysm ascending aorta. Follow-up imaging in 6 months recommended to assess stability



Reported By: Jase Askew 

Electronically Signed:  2/14/2025 8:58 PM

## 2025-02-14 NOTE — ER
Nurse's Notes                                                                                     

 [facility]                                                                 

Name: Zoltan Langston                                                                              

Age: 69 yrs                                                                                       

Sex: Male                                                                                         

: 1955                                                                                   

MRN: [id number]                                                                                   

Arrival Date: 2025                                                                          

Time: 18:32                                                                                       

Account#: [id number]                                                                            

Bed 5                                                                                             

Private MD:                                                                                       

Diagnosis: Dyspnea;Essential (primary) hypertension;Tobacco use-PAST, FORMER SMOKER;COPD/ Chronic 

  obstructive pulmonary disease with (acute) exacerbation;Obesity, unspecified;Hypoxemia          

                                                                                                  

Presentation:                                                                                     

                                                                                             

18:43 Chief complaint: Patient states: SOB and fatigue x 1 week, worse today now with some    ss  

      nausea. Coronavirus screen: Client denies travel out of the [place] in the last 14 days.       

      Ebola Screen: Patient denies exposure to infectious person. Patient denies travel to an     

      Ebola-affected area in the 21 days before illness onset. Initial Sepsis Screen: Does        

      the patient meet any 2 criteria? HR > 90 bpm. Does the patient have a suspected source      

      of infection? No. Patient's initial sepsis screen is negative. Risk Assessment: Do you      

      want to hurt yourself or someone else? Patient reports no desire to harm self or            

      others. Onset of symptoms was 2025.                                            

18:43 Method Of Arrival: Ambulatory                                                           ss  

18:43 Acuity: AIDEE 2                                                                           ss  

                                                                                                  

Triage Assessment:                                                                                

22:06 General: Appears in no apparent distress. comfortable. Respiratory: Onset: The          kj2 

      symptoms/episode began/occurred gradually, the patient has moderate shortness of            

      breath. Respiratory: Airway is patent Trachea midline Respiratory effort is even,           

      unlabored, Respiratory pattern is regular, symmetrical.                                     

                                                                                                  

Historical:                                                                                       

- Allergies:                                                                                      

18:46 No Known Allergies;                                                                     ss  

- Home Meds:                                                                                      

18:46 Hydrochlorothiazide Oral [Active]; losartan oral [Active];                              ss  

- PMHx:                                                                                           

18:46 BPH (Unknown); Hypertension;                                                            ss  

                                                                                                  

- Immunization history:: Adult Immunizations up to date.                                          

- Infectious Disease History:: Denies.                                                            

- Social history:: Smoking status: unknown.                                                       

                                                                                                  

                                                                                                  

Screenin:38 [facility] ED Fall Risk Assessment (Adult) History of falling in the last 3 months,       kj2 

      including since admission No falls in past 3 months (0 pts) Confusion or Disorientation     

      No (0 pts) Intoxicated or Sedated No (0 pts) Impaired Gait No (0 pts) Mobility Assist       

      Device Used No (0 pt) Altered Elimination No (0 pt) Score/Fall Risk Level 0 - 2 = Low       

      Risk Oriented to surroundings, Maintained a safe environment, Educated pt \T\ family on     

      fall prevention, incl call for assistance when getting out of bed, Assessed \T\             

      reinforced patient's understanding of fall precautions, Hourly rounding (assess needs \T\   

      fall precautionary measures) done, Used ambulatory aids as needed (educated on \T\          

      assisted with), Used gait belt as appropriate. Abuse screen: Denies threats or abuse.       

      Nutritional screening: No deficits noted. Tuberculosis screening: No symptoms or risk       

      factors identified.                                                                         

                                                                                                  

Assessment:                                                                                       

19:38 General: Appears in no apparent distress. comfortable, Behavior is calm, cooperative,   kj2 

      appropriate for age. Pain: Complains of pain in right upper quadrant and left upper         

      quadrant Pain currently is 6 out of 10 on a pain scale. Quality of pain is described as     

      aching, crampy. Neuro: No deficits noted. Level of Consciousness is awake, alert, obeys     

      commands, Oriented to person, place, time, situation, Appropriate for age.                  

      Cardiovascular: Reports shortness of breath, Denies chest pain, Heart tones S1 S2           

      present Capillary refill < 3 seconds in bilateral fingers Patient's skin is warm and        

      dry. Rhythm is regular. Respiratory: Airway is patent Respiratory effort is even,           

      unlabored, Breath sounds are clear bilaterally. GI: Abdomen is round distended, Bowel       

      sounds present X 4 quads. Reports upper abdominal pain, Pain is 6 out of 10 on a pain       

      scale. Patient currently denies nausea, vomiting. : No signs and/or symptoms were         

      reported regarding the genitourinary system. EENT: No signs and/or symptoms were            

      reported regarding the EENT system. Derm: No signs and/or symptoms reported regarding       

      the dermatologic system. Musculoskeletal: No signs and/or symptoms reported regarding       

      the musculoskeletal system.                                                                 

20:40 Reassessment: Patient appears in no apparent distress at this time. Patient and/or      kj2 

      family updated on plan of care and expected duration. Pain level reassessed. Patient is     

      alert, oriented x 3, equal unlabored respirations, skin warm/dry/pink.                      

22:03 Reassessment: Patient appears in no apparent distress at this time. Patient and/or      kj2 

      family updated on plan of care and expected duration. Pain level reassessed. Patient is     

      alert, oriented x 3, equal unlabored respirations, skin warm/dry/pink. Patient states       

      feeling better. Patient states symptoms have improved.                                      

                                                                                                  

Vital Signs:                                                                                      

18:43  / 103; Pulse 99; Resp 24; Temp 98.2(O); Pulse Ox 89% on R/A; Pain 0/10;          ss  

20:40  / 97; Pulse 113; Resp 20; Pulse Ox 95% ;                                         kj2 

22:03 Pulse 111; Resp 20; Temp 98.2; Pulse Ox 95% on 2 lpm NC; Pain 3/10;                     kj2 

18:43 Pain Scale: Adult                                                                       ss  

22:03 Pain Scale: Adult                                                                       kj2 

                                                                                                  

[place] Coma Score:                                                                               

19:38 Eye Response: spontaneous(4). Motor Response: obeys commands(6). Verbal Response:       kj2 

      oriented(5). Total: 15.                                                                     

22:03 Eye Response: spontaneous(4). Motor Response: obeys commands(6). Verbal Response:       kj2 

      oriented(5). Total: 15.                                                                     

                                                                                                  

ED Course:                                                                                        

18:36 Patient arrived in ED.                                                                  ss  

18:45 Enrike Mulligan MD is Attending Physician.                                             shlomo 

18:46 Triage completed.                                                                       ss  

18:46 Arm band placed on left wrist.                                                          ss  

19:10 XRAY Chest (1 view) In Process Unspecified.                                             EDMS

19:18 EKG done, by ED staff.                                                                  sa1 

19:20 No provider procedures requiring assistance completed.                                  kj2 

19:20 Initial lab(s) drawn, by me, sent to lab. First set of blood cultures drawn by me.      kj2 

      Inserted saline lock: 20 gauge in right antecubital area, using aseptic technique.          

      Blood collected. Flushed with 10 mL NS. Oxygen administered via a nebulizer mask.           

      Response to oxygen therapy: symptoms improved.                                              

19:35 Second set of blood cultures drawn by me.                                               kj2 

19:38 Dominga Hilario, RN is Primary Nurse.                                                   kj2 

19:38 Patient has correct armband on for positive identification. Client placed on continuous kj2 

      cardiac and pulse oximetry monitoring. NIBP monitoring applied. Cardiac monitor on.         

      Pulse ox on. NIBP on. Door closed. Noise minimized. Visitors limited. Warm blanket          

      given. Pillow given. Verbal reassurance given. Head of bed elevated.                        

19:57 Santhosh Bettencourt MD is Hospitalizing Provider.                                         shlomo 

20:36 CT Chest For PE Angio In Process Unspecified.                                           EDMS

22:03 Provided Education on: need for admission.                                              kj2 

22:03 Patient admitted, IV remains in place.                                                  kj2 

                                                                                                  

Administered Medications:                                                                         

19:54 Drug: Lovenox Sub-Q 40 mg Sub-Q once Route: Sub-Q; Site: abdomen;                       kj2 

22:04 Follow up: Response: No adverse reaction                                                kj2 

19:54 Drug: Famotidine IVP 20 mg IVP once; dilute with 10 mL 0.9% NaCl; give over 2 minutes   kj2 

      Route: IVP; Site: right antecubital;                                                        

22:04 Follow up: Response: No adverse reaction                                                kj2 

19:54 Drug: Magnesium Sulfate IVPB 2 grams IVPB once over 2 hrs Route: IVPB; Infused Over: 2  kj2 

      hrs; Site: right antecubital;                                                               

22:04 Follow up: Response: No adverse reaction; IV Status: Completed infusion; IV Intake: 50pazp4 

19:55 Drug: NS 0.9% IV 1000 ml IV at 125 ml/hr once Route: IV; Rate: 125 ml/hr; Site: right   kj 

      antecubital;                                                                                

22:07 Follow up: Response: No adverse reaction; IV Status: Completed infusion                 kj2 

19:55 Drug: Levalbuterol Inhalation 2.5 mg Inhalation once Route: Inhalation;                 kj2 

22:05 Follow up: Response: No adverse reaction                                                kj2 

19:55 Drug: Ipratropium Inhalation Aerosol 0.5 mg Inhalation once Route: Inhalation;          kj2 

22:05 Follow up: Response: No adverse reaction                                                kj2 

19:55 Drug: MethylPrednisoLONE  mg IVP once Route: IVP; Site: right antecubital;       kj2 

22:05 Follow up: Response: No adverse reaction                                                kj2 

19:55 Drug: Levalbuterol Inhalation 1.25 mg Inhalation once Route: Inhalation;                kj2 

22:05 Follow up: Response: No adverse reaction                                                kj2 

20:12 Drug: Rocephin IV 1 grams IV at per protocol once; Given slow IV push per pharmacy      kj2 

      instructions Route: IV; Rate: per protocol; Site: right antecubital;                        

22:04 Follow up: Response: No adverse reaction; IV Status: Completed infusion; IV Intake: 32mhrn6 

20:12 Drug: AZITHromycin  mg PO once Route: PO;                                         kj2 

22:04 Follow up: Response: No adverse reaction                                                kj2 

21:18 Not Given (Patient Refused): Mucomyst - [id number] mg PO once                    kj2 

21:28 Drug: Levalbuterol Inhalation 2.5 mg Inhalation once Route: Inhalation;                 kj2 

22:04 Follow up: Response: No adverse reaction                                                kj2 

21:28 Drug: Decadron - Dexamethasone IVP 10 mg IVP once Route: IVP; Site: right antecubital;  kj2 

22:04 Follow up: Response: No adverse reaction                                                kj2 

                                                                                                  

                                                                                                  

Medication:                                                                                       

19:38 VIS not applicable for this client.                                                     kj2 

                                                                                                  

Intake:                                                                                           

22:04 IV: 50ml; Total: 50ml.                                                                  kj2 

22:04 IV: 50ml; Total: 100ml.                                                                 kj2 

                                                                                                  

Outcome:                                                                                          

19:58 Decision to Hospitalize by Provider.                                                    shlomo 

22:03 Admitted to Tele accompanied by tech, via stretcher, room 415, with oxygen, with chart, kj2 

22:03 Condition: stable                                                                           

22:03 Instructed on the need for admit, Demonstrated understanding of follow-up care,             

      medications,                                                                                

22:06 Patient left the ED.                                                                    kj2 

                                                                                                  

Signatures:                                                                                       

Dispatcher [employer]                           Enrike Wright MD MD cha Blanchard, Shelby, RN                   RN                                                      

Sultan Antonio                             [facility]                                                  

Dominga Hilario, KALA                     RN   kj2                                                  

                                                                                                  

**************************************************************************************************

## 2025-02-14 NOTE — XMS REPORT
Continuity of Care Document



                          Created on: 2025





FER MCKEON

External Reference #: [id number]

: 1955

Sex: Male



Demographics





                                        Address             [address]

[place]  [address]

 

                                        Home Phone          [phone]

 

                                        Work Phone          [phone]

 

                                        Mobile Phone        [phone]

 

                                        Email Address       [email or web address]

[email or web address]

 

                                        Preferred Language  en

 

                                        Marital Status      Unknown

 

                                        Roman Catholic Affiliation Unknown

 

                                        Race                Unknown

 

                                        Additional Race(s)  White

 

                                        Ethnic Group         or 





Author





                                        Name                Unknown

 

                                        Address             [address]

[place]  [address]

 

                                        [facility]

[facility]connect

 

                                        Address             [address]

[address]

[place]  [address]

 

                                        Phone               [phone]





Support





                          Name         Relationship Address      Phone

 

                                Fer Mckeon Personal Relationship [address]

[place] [id number] [phone]

 

                                [facility] Don[facility] Emergency Contact [address] [place] [address]                     [phone]

 

                                Fer Mckeon Personal Relationship [address]

[place], [place]  [id number] [phone]

 

                                Fer Mckeon Personal Relationship [address] [place]  [address] [phone]

 

                                Fer Mckeon Personal Relationship [address]

[place] [id number] [phone]





Care Team Providers





                                Care Team Member Name Role            Phone

 

                                Ines Lux  Attending Clinician Unavailable

 

                                Alba Degroot      Attending Clinician Unavailable







Payers





                    Payer Name Policy Type Policy Number Effective Date Expirati

on Date Source

 

                                                    [employer]

ing Medicare 

Replace      53           [id number]                               [facility]







Problems





                                                    Condition 

Name                                    Condition 

Details                                 Condition 

Category                  Status                    Onset 

Date                                    Resolution 

Date                                    Last 

Treatment 

Date                                    Treating 

Clinician                 Comments                  Source

 

                                                    Chronic 

kidney 

disease 

stage 3A                                Stage 3a 

chronic 

kidney 

disease Problem                                                 [facility]

 

                                        [id number]           History of 

colon 

polyps  Problem                                                 [facility]

 

                                        [id number]           Diverticul

osis    Problem                                                 [facility]

 

                                        [id number]           Lower 

urinary 

tract 

symptoms 

(LUTS)  Problem                                                 [facility]

 

                                        [id number]           Recurrent 

UTI     Problem                                                 [facility]

 

                                        [id number]           Other 

ejaculator

y 

dysfunctio

n       Problem                                                 [facility]

 

                                                    Gastroesop

hageal 

reflux 

disease                                 Gastroesop

hageal 

reflux 

disease, 

esophagiti

s presence 

not 

specified Problem                                                 [facility]

 

                                                    Seasonal 

allergy                                 Seasonal 

allergies Problem                                                 [facility]

 

                                                    [id number]

[id number]                                   Postinfect

vilma 

stricture 

of 

overlappin

g sites of 

urethra in 

male    Problem                                                 [facility]

 

            [id number] Urethritis Problem                                     Comm

on 

[facility]

 

                                        [id number]           BPH loc w 

urin 

obs/LUTS Problem                                                 [facility]

 

                                        [id number]           Hearing 

difficulty 

of both 

ears    Problem                                                 [facility]

 

            [id number] Neuropathy Problem                                     Com

[facility]

 

                                        [id number]           Establishi

ng care 

with new 

doctor, 

encounter 

for     Problem                                                 [facility]

 

                                        [id number]            Essential 

hypertensi

on      Problem                                                 [facility]

 

                                        [id number]           Morbid 

obesity 

due to 

excess 

calories Problem                                                 [facility]

 

                                        [id number]            Hyperurice

aileen     Problem                                                 [facility]

 

                                                    [id number]

[id number]                                History of 

2019 novel 

coronaviru

s disease 

(COVID-19) Problem                                                 [facility]

 

                                        [id number]            Other 

chronic 

pain    Problem                                                 [facility]

 

                                        [id number]           Acute 

bacterial 

conjunctiv

itis of 

right eye Problem                                                 [facility]

 

                                        [id number]           Elevated 

uric acid 

in blood Problem                                                 [facility]

 

                                        [id number]            Hypertensi

on, 

unspecifie

d type  Problem                                                 [facility]

 

                                        [id number]            Right 

kidney 

stone   Problem                                                 [facility]

 

                                        [id number]           Hospital 

discharge 

follow-up Problem                                                 [facility]

 

                                        [id number]           Fatty 

liver   Problem                                                 [facility]

 

                                        18620077            Vitamin D 

deficiency Problem                                                 [facility]







Social History





                    Social Habit Start Date Stop Date Quantity  Comments  Source

 

                    History of Tobacco Use                                      

   [facility]

 

                    Sex Assigned At Birth                                       

  [facility]







                          Smoking Status Start Date   Stop Date    Source

 

                          Never Smoker                           [facility]

 

                          Former Smoker 2023 00:00:00 2023 00:00:00 

[facility]

 

                          Current Smoker 2022 00:00:00              [facility]







Medications





                                                    Ordered 

Medication 

Name                                    Filled 

Medication 

Name                                    Start 

Date                                    Stop 

Date                                    Current 

Medication?                             Ordering 

Clinician       Indication      Dosage          Frequency       Signature 

(SIG)               Comments            Components          Source

 

                                                    hydroCHLORO

thiazide 

12.5 MG                                 hydroCHLORO

thiazide 

12.5 MG                          No                               1{table

t_in_th

e_morni

ng}                       QD                        hydroCHLOR

Othiazide 

12.5 MG                                                     

 

                                                    Losartan 

Potassium 

50 MG                                   Losartan 

Potassium 

50 MG                            No                               1{table

t}                        QD                        Losartan 

Potassium 

50 MG                                                       

 

                                                    Cyclobenzap

rine HCl 10 

MG                                      Cyclobenzap

rine HCl 10 

MG                               No                               1{table

t_as_ne

eded}                     BID                       Cyclobenza

calixto HCl 

10 MG                                                       







Immunizations





                                                    Ordered 

Immunization Name                       Filled Immunization 

Name            Date            Status          Comments        Source

 

                                                    Moderna COVID-19 

Vaccine (Low Dose 

Booster)                                Moderna COVID-19 

Vaccine (Low Dose 

Booster)                                2022 

09:52:00            Completed                               [facility]

 

                                                    Moderna COVID-19 

Vaccine (Low Dose 

Booster)                                Moderna COVID-19 

Vaccine (Low Dose 

Booster)                                2022 

09:52:00            Completed                               [facility]

 

                                                    Moderna COVID-19 

Vaccine (Low Dose 

Booster)                                Moderna COVID-19 

Vaccine (Low Dose 

Booster)                                2022 

09:52:00            Completed                               [facility]

 

                                                    Moderna COVID-19 

Vaccine (Low Dose 

Booster)                                Moderna COVID-19 

Vaccine (Low Dose 

Booster)                                2022 

09:52:00            Completed                               [facility]

 

                                                    Moderna COVID-19 

Vaccine (Low Dose 

Booster)                                Moderna COVID-19 

Vaccine (Low Dose 

Booster)                                2022 

09:52:00            Completed                               [facility]

 

                                                    Moderna COVID-19 

Vaccine (Low Dose 

Booster)                                Moderna COVID-19 

Vaccine (Low Dose 

Booster)                                2022 

09:52:00            Completed                               [facility]

 

                                                    Moderna COVID-19 

Vaccine (Low Dose 

Booster)                                Moderna COVID-19 

Vaccine (Low Dose 

Booster)                                2022 

09:52:00            Completed                               [facility]

 

                                                    Moderna COVID-19 

Vaccine (Low Dose 

Booster)                                Moderna COVID-19 

Vaccine (Low Dose 

Booster)                                2022 

09:52:00            Completed                               [facility]

 

                                                    Moderna COVID-19 

Vaccine (Low Dose 

Booster)                                Moderna COVID-19 

Vaccine (Low Dose 

Booster)                                2022 

09:52:00            Completed                               [facility] 

Vaccine (Low Dose 

Booster)                                Moderna COVID-19 

Vaccine (Low Dose 

Booster)                                2022 

09:52:00            Completed                               [facility]

 

                                                    Prevnar 13 

-Pneumonia Vaccine                      Prevnar 13 

-Pneumonia Vaccine                      2020 

12:13:00            Completed                               [facility]

 

                                                    Prevnar 13 

-Pneumonia Vaccine                      Prevnar 13 

-Pneumonia Vaccine                      2020 

12:13:00            Completed                               [facility]

 

                                                    Prevnar 13 

-Pneumonia Vaccine                      Prevnar 13 

-Pneumonia Vaccine                      2020 

12:13:00            Completed                               [facility]

 

                                                    Prevnar 13 

-Pneumonia Vaccine                      Prevnar 13 

-Pneumonia Vaccine                      2020 

12:13:00            Completed                               [facility]

 

                                                    Prevnar 13 

-Pneumonia Vaccine                      Prevnar 13 

-Pneumonia Vaccine                      2020 

12:13:00            Completed                               [facility]

 

                                                    Prevnar 13 

-Pneumonia Vaccine                      Prevnar 13 

-Pneumonia Vaccine                      2020 

12:13:00            Completed                               [facility]

 

                                                    Prevnar 13 

-Pneumonia Vaccine                      Prevnar 13 

-Pneumonia Vaccine                      2020 

12:13:00            Completed                               [facility]

 

                                                    Prevnar 13 

-Pneumonia Vaccine                      Prevnar 13 

-Pneumonia Vaccine                      2020 

12:13:00            Completed                               [facility]

 

                                                    Prevnar 13 

-Pneumonia Vaccine                      Prevnar 13 

-Pneumonia Vaccine                      2020 

12:13:00            Completed                               [facility]

 

                                                    Prevnar 13 

-Pneumonia Vaccine                      Prevnar 13 

-Pneumonia Vaccine                      2020 

12:13:00            Completed                               [facility]

 

                                                    Prevnar 13 

-Pneumonia Vaccine                      Prevnar 13 

-Pneumonia Vaccine                      2020 

00:00:00            Completed                               [facility]

 

                                                    Moderna COVID-19 

Vaccine (Low Dose 

Booster)                                Moderna COVID-19 

Vaccine (Low Dose 

Booster)        Unknown         Completed                       [facility]

 

                                                    Prevnar 13 

-Pneumonia Vaccine                      Prevnar 13 

-Pneumonia Vaccine Unknown         Completed                       [facility]

 

                                                    Moderna COVID-19 

Vaccine (Low Dose 

Booster)                                Moderna COVID-19 

Vaccine (Low Dose 

Booster)        Unknown         Completed                       [facility]

 

                                                    Prevnar 13 

-Pneumonia Vaccine                      Prevnar 13 

-Pneumonia Vaccine Unknown         Completed                       [facility]

 

                                                    Moderna COVID-19 

Vaccine (Low Dose 

Booster)                                Moderna COVID-19 

Vaccine (Low Dose 

Booster)        Unknown         Completed                       [facility]

 

                                                    Prevnar 13 

-Pneumonia Vaccine                      Prevnar 13 

-Pneumonia Vaccine Unknown         Completed                       [facility]

 

                                                    Moderna COVID-19 

Vaccine (Low Dose 

Booster)                                Moderna COVID-19 

Vaccine (Low Dose 

Booster)        Unknown         Completed                       [facility]

 

                                                    Prevnar 13 

-Pneumonia Vaccine                      Prevnar 13 

-Pneumonia Vaccine Unknown         Completed                       [facility]

 

                                                    Moderna COVID-19 

Vaccine (Low Dose 

Booster)                                Moderna COVID-19 

Vaccine (Low Dose 

Booster)        Unknown         Completed                       [facility]

 

                                                    Prevnar 13 

-Pneumonia Vaccine                      Prevnar 13 

-Pneumonia Vaccine Unknown         Completed                       [facility]

 

                                                    Moderna COVID-19 

Vaccine (Low Dose 

Booster)                                Moderna COVID-19 

Vaccine (Low Dose 

Booster)        Unknown         Completed                       [facility]

 

                                                    Prevnar 13 

-Pneumonia Vaccine                      Prevnar 13 

-Pneumonia Vaccine Unknown         Completed                       [facility]

 

                                                    Moderna COVID-19 

Vaccine (Low Dose 

Booster)                                Moderna COVID-19 

Vaccine (Low Dose 

Booster)        Unknown         Completed                       [facility]

 

                                                    Prevnar 13 

-Pneumonia Vaccine                      Prevnar 13 

-Pneumonia Vaccine Unknown         Completed                       [facility]

 

                                                    MODERNA COVID-19 

VACCINE (LOW DOSE 

BOOSTER)                                MODERNA COVID-19 

VACCINE (LOW DOSE 

BOOSTER)        Unknown         Completed                       [facility]

 

                                                    Prevnar 13 

-Pneumonia Vaccine                      Prevnar 13 

-Pneumonia Vaccine Unknown         Completed                       [facility]

 

                                                    MODERNA COVID-19 

VACCINE (LOW DOSE 

BOOSTER)                                MODERNA COVID-19 

VACCINE (LOW DOSE 

BOOSTER)        Unknown         Completed                       [facility]

 

                                                    Prevnar 13 

-Pneumonia Vaccine                      Prevnar 13 

-Pneumonia Vaccine Unknown         Completed                       [facility]

 

[facility] 

[facility] (LOW DOSE 

BOOSTER)                                MODERNA COVID-19 

VACCINE (LOW DOSE 

BOOSTER)        Unknown         Completed                       [facility]

 

                                                    Prevnar 13 

-Pneumonia Vaccine                      Prevnar 13 

-Pneumonia Vaccine Unknown         Completed                       [facility]







Vital Signs





                      Vital Name Observation Time Observation Value Laura altamirano

 

                      height     2024 08:20:00 70 [in_i]             Commo

n [facility]

 

                      weight     2024 08:20:00 286 [lb_av]            Comm

on [facility]

 

                      temperature 2024 08:20:00 97.3 [degF]            Com

[facility]

 

                      bmi        2024 08:20:00 41.03 kg/m2            Comm

on [facility]

 

                      oximetry   2024 08:20:00 95 %                  Commo

n [facility]

 

                      respiratory rate 2024 08:20:00 16 /min              

 [facility]

 

                                                    blood pressure 

systolic        2024 08:20:00 132 mm[Hg]                      [facility]

 

                                                    blood pressure 

diastolic       2024 08:20:00 78 mm[Hg]                       [facility]

 

                      height     2024 15:20:00 70 [in_i]             Commo

n [facility]

 

                      weight     2024 15:20:00 280 [lb_av]            Comm

on [facility]        2024 15:20:00 40.17 kg/m2            Comm

on [facility]

 

                      height     2024 16:45:00 70 [in_i]             Commo

n [facility]

 

                      weight     2024 16:45:00 282.2 [lb_av]            Co

mmon [facility]

 

                      temperature 2024 16:45:00 98.7 [degF]            Com

mon [facility]

 

                      bmi        2024 16:45:00 40.49 kg/m2            Comm

on [facility]

 

                      oximetry   2024 16:45:00 93 %                  Commo

n [facility]

 

                      respiratory rate 2024 16:45:00 16 /min              

 Common [facility]

 

                                                    blood pressure 

systolic        2024 16:45:00 136 mm[Hg]                      [facility]

 

                                                    blood pressure 

diastolic       2024 16:45:00 82 mm[Hg]                       [facility]

 

                      height     2024-09-10 15:30:00 70 [in_i]             Commo

n [facility]

 

                      weight     2024-09-10 15:30:00 278 [lb_av]            Comm

on [facility]

 

                      bmi        2024-09-10 15:30:00 39.88 kg/m2            Comm

on [facility]

 

                      height     2024 15:40:00 70 [in_i]             Commo

n [facility]

 

                      weight     2024 15:40:00 280 [lb_av]            Comm

on [facility]

 

                      bmi        2024 15:40:00 40.17 kg/m2            Comm

on [facility]

 

                      height     2024 16:15:00 70 [in_i]             Commo

n [facility]

 

                      weight     2024 16:15:00 279.4 [lb_av]            Co

mmon [facility]

 

                      temperature 2024 16:15:00 97.3 [degF]            Com

mon [facility]

 

                      bmi        2024 16:15:00 40.09 kg/m2            Comm

on [facility]

 

                      oximetry   2024 16:15:00 95 %                  Commo

n [facility]

 

                      respiratory rate 2024 16:15:00 16 /min              

 [facility]

 

                                                    blood pressure 

systolic        2024 16:15:00 124 mm[Hg]                      [facility]

 

                                                    blood pressure 

diastolic       2024 16:15:00 76 mm[Hg]                       [facility]

 

                      height     2024 14:40:00 70 [in_i]             Commo

n [facility]

 

                      weight     2024 14:40:00 281.0 [lb_av]            Co

[facility]

 

                      temperature 2024 14:40:00 97.8 [degF]            Com

[facility]

 

                      bmi        2024 14:40:00 40.31 kg/m2            Comm

on [facility]

 

                      oximetry   2024 14:40:00 96 %                  Commo

n [facility]

 

                      respiratory rate 2024 14:40:00 16 /min              

 Common [facility]

 

                                                    blood pressure 

systolic        2024 14:40:00 126 mm[Hg]                      [facility]

t [facility]

 

                                                    blood pressure 

diastolic       2024 14:40:00 84 mm[Hg]                       [facility]

 

                      height     2024 14:40:00 70 [in_i]             Commo

n [facility]

 

                      weight     2024 14:40:00 281.0 [lb_av]            Co

[facility]

 

                      temperature 2024 14:40:00 97.8 [degF]            Com

[facility]

 

                      bmi        2024 14:40:00 40.31 kg/m2            Comm

on [facility]

 

                      oximetry   2024 14:40:00 96 %                  Commo

n [facility]

 

                      respiratory rate 2024 14:40:00 16 /min              

 [facility]

 

                                                    blood pressure 

systolic        2024 14:40:00 126 mm[Hg]                      [facility]

t [facility]

 

                                                    blood pressure 

diastolic       2024 14:40:00 84 mm[Hg]                       [facility]

[facility]

 

                      height     2024 16:40:00 70 [in_i]             Commo

n [facility]

 

                      weight     2024 16:40:00 290 [lb_av]            Comm

on [facility]

 

                      temperature 2024 16:40:00 98.7 [degF]            Com

mon [facility]

 

                      bmi        2024 16:40:00 41.61 kg/m2            Comm

on [facility]

 

                      height     2024 16:40:00 70 [in_i]             Commo

n [facility]

 

                      weight     2024 16:40:00 284 [lb_av]            Comm

on [facility]

 

                      bmi        2024 16:40:00 40.75 kg/m2            Comm

on [facility]

 

                      height     2023-10-23 16:40:00 70 [in_i]             Commo

n [facility]

 

                      weight     2023-10-23 16:40:00 385 [lb_av]            Comm

on [facility]

 

                      bmi        2023-10-23 16:40:00 55.24 kg/m2            Comm

on [facility]

 

                      height     2023 16:30:00 70 [in_i]             Commo

n [facility]

 

                      weight     2023 16:30:00 289 [lb_av]            Comm

on [facility]

 

                      temperature 2023 16:30:00 98.6 [degF]            Com

mon [facility]

 

                      bmi        2023 16:30:00 41.46 kg/m2            Comm

on [facility]

 

                      oximetry   2023 16:30:00 99 %                  Commo

n [facility]

 

                      respiratory rate 2023 16:30:00 18 /min              

 [facility]

 

                                                    blood pressure 

systolic        2023 16:30:00 143 mm[Hg]                      [facility]

 

                                                    blood pressure 

diastolic       2023 16:30:00 91 mm[Hg]                       [facility]

 

                      height     2023-05-10 08:00:00 70 [in_i]             Commo

n [facility]

 

                      weight     2023-05-10 08:00:00 288 [lb_av]            Comm

on [facility]

 

                      temperature 2023-05-10 08:00:00 98.4 [degF]            Com

mon [facility]

 

                      bmi        2023-05-10 08:00:00 41.32 kg/m2            Comm

on [facility]

 

                      oximetry   2023-05-10 08:00:00 97 %                  Commo

n [facility]

 

                      respiratory rate 2023-05-10 08:00:00 17 /min              

 [facility]

 

                                                    blood pressure 

systolic        2023-05-10 08:00:00 132 mm[Hg]                      [facility]

t [facility]

 

                                                    blood pressure 

diastolic       2023-05-10 08:00:00 78 mm[Hg]                       [facility]

 

                      height     2023-05-10 08:00:00 70 [in_i]             Commo

n [facility]

 

                      weight     2023-05-10 08:00:00 288 [lb_av]            Comm

on [facility]

 

                      temperature 2023-05-10 08:00:00 98.4 [degF]            Com

mon [facility]

 

                      bmi        2023-05-10 08:00:00 41.32 kg/m2            Comm

on [facility]

 

                      oximetry   2023-05-10 08:00:00 97 %                  Commo

n [facility]

 

                      respiratory rate 2023-05-10 08:00:00 17 /min              

 [facility]

 

                                                    blood pressure 

systolic        2023-05-10 08:00:00 132 mm[Hg]                      [facility]

t [facility]

 

                                                    blood pressure 

diastolic       2023-05-10 08:00:00 78 mm[Hg]                       [facility]

 

                      height     2023 11:20:00 70 [in_i]             Commo

n [facility]

 

                      weight     2023 11:20:00 285 [lb_av]            Comm

on [facility]

 

                      temperature 2023 11:20:00 98.1 [degF]            Com

mon [facility]

 

                      bmi        2023 11:20:00 40.89 kg/m2            Comm

on [facility]

 

                      oximetry   2023 11:20:00 95 %                  Commo

n [facility]

 

                      respiratory rate 2023 11:20:00 17 /min              

 Common [facility]

 

                                                    blood pressure 

systolic        2023 11:20:00 134 mm[Hg]                      [facility]

t [facility]

 

                                                    blood pressure 

diastolic       2023 11:20:00 80 mm[Hg]                       [facility]

t [facility]

 

                      height     2022-12-15 17:00:00 70 [in_i]             Commo

n [facility]

 

                      weight     2022-12-15 17:00:00 293 [lb_av]            Comm

on [facility]

 

                      temperature 2022-12-15 17:00:00 98.3 [degF]            Com

mon [facility]

 

                      bmi        2022-12-15 17:00:00 42.04 kg/m2            Comm

on [facility]

 

                      oximetry   2022-12-15 17:00:00 96 %                  Commo

n [facility]

 

                      respiratory rate 2022-12-15 17:00:00 16 /min              

 [facility]

 

                                                    blood pressure 

systolic        2022-12-15 17:00:00 138 mm[Hg]                      [facility]

t [facility]

 

                                                    blood pressure 

diastolic       2022-12-15 17:00:00 99 mm[Hg]                       [facility]

t [facility]

 

                      height     2022-10-06 16:00:00 70 [in_i]             Commo

n [facility]

 

                      weight     2022-10-06 16:00:00 293 [lb_av]            Comm

on [facility]

 

                      temperature 2022-10-06 16:00:00 98.4 [degF]            Com

mon [facility]

 

                      bmi        2022-10-06 16:00:00 42.04 kg/m2            Comm

on [facility]

 

                      oximetry   2022-10-06 16:00:00 95 %                  Commo

n [facility]

 

                                                    blood pressure 

systolic        2022-10-06 16:00:00 118 mm[Hg]                      [facility]

t [facility]

 

                                                    blood pressure 

diastolic       2022-10-06 16:00:00 74 mm[Hg]                       [facility]

[facility]

 

                      height     2022 14:30:00 70 [in_i]             Commo

n [facility]

 

                      weight     2022 14:30:00 296 [lb_av]            Comm

on [facility]

 

                      temperature 2022 14:30:00 98.4 [degF]            Com

mon [facility]

 

                      bmi        2022 14:30:00 42.47 kg/m2            Comm

on [facility]

 

                      oximetry   2022 14:30:00 97 %                  Commo

n [facility]

 

                      respiratory rate 2022 14:30:00 16 /min              

 [facility]

 

                                                    blood pressure 

systolic        2022 14:30:00 140 mm[Hg]                      [facility]

[facility]

 

                                                    blood pressure 

diastolic       2022 14:30:00 84 mm[Hg]                       [facility]

 

                      height     2022-05-10 16:20:00 70 [in_i]             Commo

n [facility]

 

                      weight     2022-05-10 16:20:00 293.8 [lb_av]            Co

mmon [facility]

 

                      temperature 2022-05-10 16:20:00 96 [degF]             Comm

on [facility]

 

                      bmi        2022-05-10 16:20:00 42.15 kg/m2            Comm

on [facility]

 

                      oximetry   2022-05-10 16:20:00 98 %                  Commo

n [facility]

 

                      respiratory rate 2022-05-10 16:20:00 16 /min              

 [facility]

 

                                                    blood pressure 

systolic        2022-05-10 16:20:00 138 mm[Hg]                      [facility]

t [facility]

 

                                                    blood pressure 

diastolic       2022-05-10 16:20:00 74 mm[Hg]                       [facility]

 

                      height     2022 10:00:00 70 [in_i]             Commo

n [facility]

 

                      weight     2022 10:00:00 297.6 [lb_av]            Co

mmon [facility]

 

                      temperature 2022 10:00:00 97.3 [degF]            Com

mon [facility]

 

                      bmi        2022 10:00:00 42.70 kg/m2            Comm

on [facility]

 

                      oximetry   2022 10:00:00 95 %                  Commo

n [facility]

 

                      respiratory rate 2022 10:00:00 16 /min              

 [facility]

 

                                                    blood pressure 

systolic        2022 10:00:00 120 mm[Hg]                      [facility]

 

                                                    blood pressure 

diastolic       2022 10:00:00 79 mm[Hg]                       [facility]

 

                      height     2022 10:40:00 70 [in_i]             Commo

n [facility]

 

                      weight     2022 10:40:00 303 [lb_av]            Comm

on [facility]

 

                      temperature 2022 10:40:00 98 [degF]             Comm

on [facility]

 

                      bmi        2022 10:40:00 43.47 kg/m2            Comm

on [facility]

 

                      oximetry   2022 10:40:00 98 %                  Commo

n [facility]

 

                                                    blood pressure 

systolic        2022 10:40:00 131 mm[Hg]                      [facility]

 

                                                    blood pressure 

diastolic       2022 10:40:00 82 mm[Hg]                       [facility]

 

                      height     2022-03-10 13:20:00 70 [in_i]             Commo

n [facility]

 

                      weight     2022-03-10 13:20:00 303 [lb_av]            Comm

on [facility]

 

                      temperature 2022-03-10 13:20:00 97.2 [degF]            Com

mon [facility]

 

                      bmi        2022-03-10 13:20:00 43.47 kg/m2            Comm

on [facility]

 

                      oximetry   2022-03-10 13:20:00 97 %                  Commo

n [facility]

 

                      respiratory rate 2022-03-10 13:20:00 20 /min              

 Common [facility]

 

                                                    blood pressure 

systolic        2022-03-10 13:20:00 141 mm[Hg]                      [facility]

 

                                                    blood pressure 

diastolic       2022-03-10 13:20:00 86 mm[Hg]                       [facility]

 

                      height     2021 08:40:00 70 [in_i]             Commo

n [facility]

 

                      weight     2021 08:40:00 298 [lb_av]            Comm

on [facility]

 

                      temperature 2021 08:40:00 97 [degF]             Comm

on [facility]

 

                      bmi        2021 08:40:00 42.75 kg/m2            Comm

on [facility]

 

                      height     2021-10-14 13:50:00 70 [in_i]             Commo

n [facility]

 

                      weight     2021-10-14 13:50:00 298.4 [lb_av]            Co

mmon [facility]

 

                      temperature 2021-10-14 13:50:00 97.4 [degF]            Com

mon [facility]

 

                      bmi        2021-10-14 13:50:00 42.81 kg/m2            Comm

on [facility]

 

                      oximetry   2021-10-14 13:50:00 95 %                  Commo

n [facility]

 

                                                    blood pressure 

systolic        2021-10-14 13:50:00 128 mm[Hg]                      [facility]

 

                                                    blood pressure 

diastolic       2021-10-14 13:50:00 84 mm[Hg]                       [facility]

 

                      height     2021 08:00:00 70 [in_i]             Commo

n [facility]

 

                      weight     2021 08:00:00 300 [lb_av]            Comm

on [facility]

 

                      temperature 2021 08:00:00 97 [degF]             Comm

on [facility]

 

                      bmi        2021 08:00:00 43.04 kg/m2            Comm

on [facility]







Encounters





                                                    Start 

Date/Time                               End 

Date/Time                               Encounter 

Type                                    Admission 

Type                                    Attending 

Clinicians                              Care 

Facility                                Care 

Department                              Encounter 

ID                                      Source

 

                                                    2024 

07:59:00                        Outpatient                      [employer] hawk              [facility]              [facility]              [id number]

[id number]                                   [facility]

 

                                                    2024 

08:49:00                        Outpatient                      [employer]              [facility]              [facility]              [id number]

[id number]                                   [facility]

 

                                                    2024-07-10 

09:35:00                        Outpatient                      [employer] hawk              [facility]              [facility]              [id number]

[id number]                                   [facility]

 

                                                    2023 

10:08:01                        Outpatient                      [employer]              [facility]              [facility]              [id number]

[id number]                                   [facility]

 

                                                    2023 

09:38:01                        Outpatient                      [employer]              [facility]              [facility]              [id number]

[id number]                                   [facility]

 

                                                    2023 

10:47:01                        Outpatient                      [employer] hawk              [facility]              [facility]              [id number]

[id number]                                   [facility]

 

                                                    2022 

14:44:01            Outpatient           Degroot Na  [facility]    [facility]    [id number]

2

[id number]                                   [facility]

 

                                                    2022-05-10 

16:07:00            Outpatient           Zane Na  [facility]    [facility]    [id number]

2

[id number]                                   [facility]

 

                                                    2022 

14:56:00            Outpatient           Zane Na  [facility]    [facility]    [id number]

2

[id number]                                   [facility]

 

                                                    2022 

11:26:01            Outpatient           Degroot, Na  [facility]    [facility]    [id number]

2

                                   [facility] 

[facility] 

[facility]

 

                                                    2022 

14:33:05            Outpatient           Degroot, Na  [facility]    [facility]    [id number]

2

                                   [facility]

 

                                                    2022 

14:32:03            Outpatient           Degroot, Na  [facility]    [facility]    [id number]

2

                                   [facility]

 

                                                    2022 

14:31:04            Outpatient           Degroot, Na  [facility]    [facility]    [id number]

2

                                   [facility] 

[facility] 

[facility]

 

                                                    2022 

14:24:01            Outpatient           Degroot, Na  [facility]    [facility]    [id number]

2

[id number]                                   [facility]

 

                                                    2022 

14:00:34            Outpatient           Degroot, Na  [facility]    [facility]    [id number]

2

[id number]                                   [facility]

 

                                                    2022 

13:09:20            Outpatient           Degroot, Na  [facility]    [facility]    [id number]

2

[id number]                                   [facility]

 

                                                    2022 

12:34:09            Outpatient           Degroot, Na  [facility]    [facility]    [id number]

2

[id number]                                   [facility]

 

                                                    2022 

12:17:47            Outpatient           Degroot, Na  [facility]    [facility]    [id number]

2

[id number]                                   [facility]

 

                                                    2022 

12:17:12            Outpatient           Degroot, Na  STLMLC    STLMLC    [id number]

2

[id number]                                   [facility]

 

                                                    2022 

12:17:04            Outpatient           Degroot, Na  STLMLC    STLMLC    [id number]

2

[id number]                                   [facility]

 

                                                    2022 

12:16:37            Outpatient           Degroot, Na  STLMLC    STLMLC    [id number]

2

[id number]                                   [facility]

 

                                                    2022 

12:16:17            Outpatient           Degroot, Na  STLMLC    STLMLC    [id number]

2

[id number]                                   [facility]

 

                                                    2022 

12:15:39            Outpatient           Degroot, Na  STLMLC    STLMLC    [id number]

2

[id number]                                   [facility]

 

                                                    2022 

11:38:09            Outpatient           Degroot, Na  STLMLC    STLMLC    [id number]

2

[id number]                                   [facility]

 

                                                    2022 

11:36:18            Outpatient           Degroot, Na  STLMLC    STLMLC    [id number]

2

[id number]                                   [facility]

 

                                                    2024 

00:00:00                                2024 

00:00:00                                OFFICE 

VISIT 

ESTAB PT 

LEVEL 3                          STLMLC     STLMLC     [id number]    [facility]

 

                                                    2024 

00:00:00                                2024 

00:00:00  (TEL)                         STLMLC    STLMLC    [id number]   [facility]

 

                                                    2024 

00:00:00                                2024 

00:00:00  (TEL)                         STLMLC    STLMLC    [id number]   [facility]

 

                                                    2024 

00:00:00                                2024 

00:00:00                                OFFICE 

VISIT 

ESTAB PT 

LEVEL 4                          STLMLC     STLMLC     [id number]    [facility]

 

                                                    2024 

00:00:00                                2024 

00:00:00  (WEB)                         STLMLC    STLMLC    [id number]   [facility]

 

                                                    2024-10-02 

00:00:00                                2024-10-02 

00:00:00  (TEL)                         STLMLC    STLMLC    [id number]   [facility]

 

                                                    2024 

00:00:00                                2024 

00:00:00                                OFFICE 

VISIT 

ESTAB PT 

LEVEL 3                          STLMLC     STLMLC     [id number]    [facility]

 

                                                    2024-09-10 

00:00:00                                2024-09-10 

00:00:00                                OFFICE 

VISIT 

ESTAB PT 

LEVEL 4                          STLMLC     STLMLC     [id number]    [facility]

 

                                                    2024 

00:00:00                                2024 

00:00:00  (TEL)                         STLMLC    STLMLC    [id number]   [facility]

 

                                                    2024 

00:00:00                                2024 

00:00:00  (WEB)                         STLMLC    STLMLC    [id number]   [facility]

 

                                                    2024 

00:00:00                                2024 

00:00:00  (WEB)                         STLMLC    STLMLC    [id number]   [facility]

 

                                                    2024 

00:00:00                                2024 

00:00:00                                OFFICE 

VISIT 

ESTAB PT 

LEVEL 4                          STLMLC     STLMLC     [id number]    [facility]

 

                                                    2024 

00:00:00                                2024 

00:00:00                                OFFICE 

VISIT 

ESTAB PT 

LEVEL 4                          STLMLC     STLMLC     [id number]    [facility]

 

                                                    2024-08-15 

00:00:00                                2024-08-15 

00:00:00  (TEL)                         STLMLC    STLMLC    [id number]   [facility]

 

                                                    2024 

00:00:00                                2024 

00:00:00  (WEB)                         STLMLC    STLMLC    [id number]   [facility]

 

                                                    2024 

00:00:00                                2024 

00:00:00                                SUB ANNUAL 

[facility] 

WELLNESS 

VISIT                            STLMLC     STLMLC     [id number]    [facility]

 

                                                    2024 

00:00:00                                2024 

00:00:00                                OFFICE 

VISIT 

ESTAB PT 

LEVEL 4                          STLMLC     STLMLC     [id number]    [facility]

 

                                                    2024 

00:00:00                                2024 

00:00:00                                OFFICE 

VISIT 

ESTAB PT 

LEVEL 3                          STLMLC     STLMLC     [id number]    [facility]

 

                                                    2024 

00:00:00                                2024 

00:00:00                                OFFICE 

VISIT 

ESTAB PT 

LEVEL 3                          STLMLC     STLMLC     [id number]    [facility]

 

                                                    2023-10-23 

00:00:00                                2023-10-23 

00:00:00                                OFFICE 

VISIT 

ESTAB PT 

LEVEL 4                          STLMLC     STLMLC     [id number]    [facility]

 

                                                    2023-08-10 

00:00:00                                2023-08-10 

00:00:00  (TEL)                         STLMLC    STLMLC    [id number]   [facility]

 

                                                    2023 

00:00:00                                2023 

00:00:00                                OFFICE 

VISIT 

ESTAB PT 

LEVEL 2                          STLMLC     STLMLC     [id number]    [facility]

 

                                                    2023 

00:00:00                                2023 

00:00:00  (TEL)                         STLMLC    STLMLC    [id number]   [facility]

 

                                                    2023 

00:00:00                                2023 

00:00:00  (TEL)                         STLMLC    STLMLC    [id number]   [facility]

 

                                                    2023-05-10 

00:00:00                                2023-05-10 

00:00:00                                OFFICE 

VISIT 

ESTAB PT 

LEVEL 3                          STLMLC     STLMLC     [id number]    [facility]

 

                                                    2023-05-10 

00:00:00                                2023-05-10 

00:00:00                                SUB ANNUAL 

[facility] 

WELLNESS 

VISIT                            STLMLC     STLMLC     [id number]    [facility]

 

                                                    2023 

00:00:00                                2023 

00:00:00                                OFFICE 

VISIT 

ESTAB PT 

LEVEL 4                          STLMLC     STLMLC     [id number]    [facility]

 

                                                    2022-12-15 

00:00:00                                2022-12-15 

00:00:00                                OFFICE 

VISIT 

ESTAB PT 

LEVEL 4                          STLMLC     STLMLC     [id number]    [facility]

 

                                                    2022 

00:00:00                                2022 

00:00:00  (TEL)                         STLMLC    STLMLC    [id number]   [facility]

 

                                                    2022 

00:00:00                                2022 

00:00:00                                OFFICE 

VISIT EST 

PT LEVEL 3                       STLMLC     STLMLC     [id number]    [facility]

 

                                                    2022-10-06 

00:00:00                                2022-10-06 

00:00:00                                OFFICE 

VISIT EST 

PT LEVEL 3                       STLMLC     STLMLC     [id number]    [facility]

 

                                                    2022 

00:00:00                                2022 

00:00:00  (WEB)                         STLMLC    STLMLC    [id number]   [facility]

 

                                                    2022 

00:00:00                                2022 

00:00:00                                OFFICE 

VISIT NEW 

PT LEVEL 3                       STLMLC     STLMLC     [id number]    [facility]

 

                                                    2022 

00:00:00                                2022 

00:00:00  (TEL)                         STLMLC    STLMLC    [id number]   [facility]

 

                                                    2022-05-10 

00:00:00                                2022-05-10 

00:00:00                                OFFICE 

VISIT 

ESTAB PT 

LEVEL 2                          STLMLC     STLMLC     [id number]    [facility]

 

                                                    2022 

00:00:00                                2022 

00:00:00  (TEL)                         STLMLC    STLMLC    [id number]   [facility]

 

                                                    2022 

00:00:00                                2022 

00:00:00  (TEL)                         STLMLC    STLMLC    [id number]   [facility]

 

                                                    2022 

00:00:00                                2022 

00:00:00  (TEL)                         STLMLC    STLMLC    [id number]   [facility]

 

                                                    2022 

00:00:00                                2022 

00:00:00                                OFFICE 

VISIT 

ESTAB PT 

LEVEL 2                          STLMLC     STLMLC     [id number]    [facility]

 

                                                    2022 

00:00:00                                2022 

00:00:00  (TEL)                         STLMLC    STLMLC    [id number]   [facility]

 

                                                    2022 

00:00:00                                2022 

00:00:00                                OFFICE 

VISIT EST 

PT LEVEL 3                       STLMLC     STLMLC     [id number]    [facility]

 

                                                    2022 

00:00:00                                2022 

00:00:00  (TEL)                         STLMLC    STLMLC    [id number]   [facility]

 

                                                    2022 

00:00:00                                2022 

00:00:00  (WEB)                         STLMLC    STLMLC    [id number]   [facility]

 

                                                    2022-03-10 

00:00:00                                2022-03-10 

00:00:00                                OFFICE 

VISIT 

ESTAB PT 

LEVEL 4                          STLMLC     STLMLC     [id number]    [facility]

 

                                                    2022 

00:00:00                                2022 

00:00:00  (TEL)                         STLMLC    STLMLC    [id number]   [facility]

 

                                                    2022-01-10 

00:00:00                                2022-01-10 

00:00:00  (TEL)                         STLMLC    STLMLC    [id number]   [facility]

 

                                                    2022-01-10 

00:00:00                                2022-01-10 

00:00:00  (TEL)                         STLMLC    STLMLC    [id number]   [facility]

 

                                                    2022-01-10 

00:00:00                                2022-01-10 

00:00:00  (WEB)                         STLMLC    STLMLC    [id number]   [facility]

 

                                                    2022 

00:00:00                                2022 

00:00:00  (TEL)                         STLMLC    STLMLC    [id number]   [facility]

 

                                                    2022 

00:00:00                                2022 

00:00:00                                (COVID 

Inj) COVID 

Injection                        STLMLC     STLMLC     [id number]    [facility]

 

                                                    2021-12-10 

00:00:00                                2021-12-10 

00:00:00  (TEL)                         STLMLC    STLMLC    [id number]   [facility]

 

                                                    2021 

00:00:00                                2021 

00:00:00  (TEL)                         STLMLC    STLMLC    [id number]   [facility]

 

                                                    2021 

00:00:00                                2021 

00:00:00                                OFFICE 

VISIT 

ESTAB PT 

LEVEL 4                          STLMLC     STLMLC     [id number]    [facility]

 

                                                    2021-10-19 

00:00:00                                2021-10-19 

00:00:00  (TEL)                         STLMLC    STLMLC    [id number]   [facility]

 

                                                    2021-10-14 

00:00:00                                2021-10-14 

00:00:00                                OFFICE 

VISIT EST 

PT LEVEL 3                       STLMLC     STLMLC     [id number]    [facility]

 

                                                    2021-10-14 

00:00:00                                2021-10-14 

00:00:00  (WEB)                         STLMLC    STLMLC    [id number]   [facility]

 

                                                    2021 

00:00:00                                2021 

00:00:00                                OFFICE 

VISIT EST 

PT LEVEL 3                       STLMLC     STLMLC     [id number]    [facility]

 

                                                    2021 

00:00:00                                2021 

00:00:00  Outpatient                     STLMLC    STLMLC    [id number]   [facility]

 

                                                    2021 

00:00:00                                2021 

00:00:00  Outpatient                     STLMLC    STLMLC    [id number]   [facility]

 

                                                    2021 

00:00:00                                2021 

00:00:00  Outpatient                     STLMLC    STLMLC    [id number]   [facility]

 

                                                    2021 

00:00:00                                2021 

00:00:00  Outpatient                     STLMLC    STLMLC    [id number]   [facility]

 

                                                    2021 

00:00:00                                2021 

00:00:00  Outpatient                     STLMLC    STLMLC    [id number]   [facility]

 

                                                    2021 

00:00:00                                2021 

00:00:00  Outpatient                     STLMLC    STLMLC    [id number]   [facility]

 

                                                    2020 

00:00:00                                2020 

00:00:00  Outpatient                     STLMLC    STLMLC    [id number]   [facility]

 

                                                    2020 

00:00:00                                2020 

00:00:00  Outpatient                     STLMLC    STLMLC    [id number]   [facility]

 

                                                    2020 

00:00:00                                2020 

00:00:00  Outpatient                     STLMLC    STLMLC    [id number]   [facility]

 

                                                    2020-10-08 

00:00:00                                2020-10-08 

00:00:00  Outpatient                     STLMLC    STLMLC    [id number]   [facility] 

[facility] 

- [facility] 

[facility]

 

                                                    2020 

10:40:00                                2020 

10:40:00        Outpatient                                      [facility]

[address] 

Family 

Medicine                                [facility]                  [id number]                   [facility] 

[facility] 

[facility]

 

                                                    2020 

10:00:00                                2020 

10:00:00        Outpatient                                      [place]

[address] 

Family 

Medicine                                [place] 

[facility] 

Medicine                  [id number]                   [facility] 

[facility] 

[facility]

 

                                                    2020 

11:20:00                                2020 

11:20:00        Outpatient                                      [place]

[address] 

Family 

Medicine                                [facility] 

[facility] 

Medicine                  [id number]                   [facility] 

- [facility]

 

                                                    2020 

08:38:00                                2020 

08:38:00        Outpatient                                      [place]

[address] 

Family 

Medicine                                [facility] 

[facility] 

Family 

Medicine                  [id number]                   [facility]

 

                                                    2020 

08:00:00                                2020 

08:00:00        Outpatient                                      [place]

[address] 

Family 

Medicine                                [place] 

[facility] 

Family 

Medicine                  [id number]                   [facility] 

- [facility]

 

                                                    2019 

08:00:00                                2019 

08:00:00        Outpatient                                      [place]

[address] 

Family 

Medicine                                [facility] 

[facility] 

Family 

Medicine                  [id number]                   [facility] 

- [facility]

 

                                                    2019 

10:00:00                                2019 

10:00:00        Outpatient                                      [place]

[address] 

Family 

Medicine                                [facility] 

[facility] 

Medicine                  [id number]                   [facility] 

- [facility]







Results





                    Test Description Test Time Test Comments Results   Result Co

mments Source

## 2025-02-15 VITALS — SYSTOLIC BLOOD PRESSURE: 142 MMHG | DIASTOLIC BLOOD PRESSURE: 67 MMHG | TEMPERATURE: 97.7 F

## 2025-02-15 VITALS — OXYGEN SATURATION: 94 %

## 2025-02-15 LAB
ALBUMIN SERPL BCP-MCNC: 3.1 G/DL (ref 3.4–5)
ALBUMIN/GLOB SERPL: 0.8 {RATIO} (ref 1.1–1.8)
ALP SERPL-CCNC: 69 U/L (ref 45–117)
ALT SERPL W P-5'-P-CCNC: 29 U/L (ref 16–61)
ANION GAP SERPL CALC-SCNC: 12.7 MEQ/L (ref 5–15)
AST SERPL W P-5'-P-CCNC: 19 U/L (ref 15–37)
BLD SMEAR INTERP: (no result)
BUN BLD-MCNC: 15 MG/DL (ref 7–18)
GLOBULIN SER CALC-MCNC: 3.9 G/DL (ref 2.3–3.5)
GLUCOSE SERPLBLD-MCNC: 186 MG/DL (ref 74–106)
HCT VFR BLD CALC: 41.2 % (ref 39.6–49)
HGB BLD-MCNC: 13.9 G/DL (ref 13.6–17.9)
LYMPHOCYTES # SPEC AUTO: 0.4 K/UL (ref 0.7–4.9)
MCH RBC QN AUTO: 29 PG (ref 27–35)
MCHC RBC AUTO-ENTMCNC: 33.7 G/DL (ref 32–36)
MCV RBC: 86 FL (ref 80–100)
MORPHOLOGY BLD-IMP: (no result)
NRBC # BLD: 0 10*3/UL (ref 0–0)
NRBC BLD AUTO-RTO: 0.1 % (ref 0–0)
PMV BLD: 9.5 FL (ref 7.6–11.3)
POTASSIUM SERPL-SCNC: 3.7 MEQ/L (ref 3.5–5.1)
RBC # BLD: 4.79 M/UL (ref 4.33–5.43)
SQUAMOUS URNS QL MICRO: <5 /HPF
UA COMPLETE W REFLEX CULTURE PNL UR: (no result)
UA DIPSTICK W REFLEX MICRO PNL UR: (no result)
WBC # BLD AUTO: 9.1 THOU/UL (ref 4.3–10.9)

## 2025-02-15 RX ADMIN — METHYLPREDNISOLONE SODIUM SUCCINATE SCH MG: 40 INJECTION, POWDER, FOR SOLUTION INTRAMUSCULAR; INTRAVENOUS at 00:43

## 2025-02-15 RX ADMIN — LOSARTAN POTASSIUM SCH: 50 TABLET, FILM COATED ORAL at 09:00

## 2025-02-15 RX ADMIN — ARFORMOTEROL TARTRATE SCH: 15 SOLUTION RESPIRATORY (INHALATION) at 07:14

## 2025-02-15 NOTE — RAD REPORT
EXAMINATION: US RENAL ULTRASOUND



CLINICAL INDICATION: Renal failure



TECHNIQUE: Real-time ultrasonography of the abdomen was performed. 



COMPARISON: No prior exam.



FINDINGS: 



RIGHT KIDNEY: Right renal length measurement: 10.8 x 6.7 x 5.2 cm. Normal in echogenicity and size. N
o calculus, solid mass or hydronephrosis.  3.8 x 3.0 cm benign cyst is present.



LEFT KIDNEY: Left renal length measurement: 10.9 x 5.4 x 4.6 cm. Normal in echogenicity and size. No 
calculus, solid mass or hydronephrosis.



URINARY BLADDER: Incompletely distended without gross abnormality detected.



ADDITIONAL FINDINGS: None.



IMPRESSION:  



3.8 cm benign cyst right kidney, otherwise unremarkable study.







Reported By: Vadim Gutierrez 

Electronically Signed:  2/15/2025 12:22 PM

## 2025-02-15 NOTE — P.DS
Admission Date: 02/14/25


Discharge Date: 02/15/25


Disposition: ROUTINE DISCHARGE


Discharge Condition: GOOD


Reason for Admission: Respiratory distress


Brief History of Present Illness: 





Patient is 69 years of age gentleman ex-smoker with a past medical history 

notable for hypertension, CKD who presented to emergency room for evaluation of 

sudden onset of shortness of breath and found to have saturation 89% on room 

air.  CTA of the chest demonstrated no pulmonary embolism or pulmonary or 

pneumonia but  findings suggestive of pulmonary emphysema in both upper lobes of

the lung.  He was admitted on general medical floor.  He has had similar 

episodes of shortness of breath on exertion in the past 1 year after bad COVID 

infection.  He does not have any diagnosis of COPD. 


Hospital Course: 


He tested negative for COVID-19, influenza and RSV by rapid antigen test. He was

treated with supplemental oxygen , 2 L per minute by nasal cannula, scheduled 

DuoNeb, IV methylprednisolone, Augmentin.  His symptoms markedly improved the 

following day, he was able to wean off the oxygen.  He is being discharged home.

 Plan is to see his PCP or pulmonologist for pulmonary function test as 

outpatient.





#1 transient borderline hypoxemia secondary to #2


 resolved


#2 highly likely COPD


#3.  Controlled hypertension


#4 CKD stage III


Stable renal function, normokalemia, no obstructive uropathy but benign renal 

cyst by ultrasound of kidney


#5 obesity


Vital Signs/Physical Exam: 














Temp Pulse Resp BP Pulse Ox


 


 97.7 F   92 H  16   142/67 H  94 


 


 02/15/25 12:00  02/15/25 12:00  02/15/25 12:00  02/15/25 12:00  02/15/25 12:00








Other Physical/Emotional Findings: - Physical Exam.  General: Obese, not acutely

ill looking, in no apparent distress,.  HEENT: Normocephalic, atraumatic, 

nonicteric sclera, nonanemic conjunctive.  Neck: Supple,  without JVD or goiter 

or thyroid mass.  Respiratory: Normal breathing effort, clear to auscultation 

bilaterally, no crackles no wheezing or rhonchi.  Cardiovascular: Regular rate 

and rhythm, S1, S2 normal, no murmur no gallop.  Gastrointestinal: Normal bowel 

sounds, nondistended, nontender, No ascites, , No masses, no hepatosplenomegaly.

 Extremities : No clubbing, No peripheral edema,.  Integumentary: No rashes, 

petechia, suspected lesions.  Lymphatics: No axilla or cervical lymphadenopathy.

 Neurology;   alert awake oriented x3, no focal neurologic deficit, normal 

affection . mood and behavior.


Laboratory Data at Discharge: 














WBC  9.10 thou/uL (4.3-10.9)   02/15/25  05:36    


 


Hgb  13.9 g/dL (13.6-17.9)   02/15/25  05:36    


 


Hct  41.2 % (39.6-49.0)   02/15/25  05:36    


 


Plt Count  154 thou/uL (152-406)   02/15/25  05:36    


 


PT  12.0 SECONDS (9.4-12.5)   02/14/25  19:20    


 


INR  1.14   02/14/25  19:20    


 


Sodium  141 mEq/L (136-145)   02/15/25  05:36    


 


Potassium  3.7 mEq/L (3.5-5.1)   02/15/25  05:36    


 


BUN  15 mg/dL (7-18)   02/15/25  05:36    


 


Creatinine  1.79 mg/dL (0.70-1.30)  H  02/15/25  05:36    


 


Glucose  186 mg/dL ()  H  02/15/25  05:36    


 


Magnesium  2.2 mg/dL (1.6-2.4)   02/14/25  19:20    


 


Total Bilirubin  0.5 mg/dL (0.2-1.0)   02/15/25  05:36    


 


AST  19 U/L (15-37)   02/15/25  05:36    


 


ALT  29 U/L (16-61)   02/15/25  05:36    


 


Alkaline Phosphatase  69 U/L ()   02/15/25  05:36    


 


Lipase  35 U/L (13-75)   02/14/25  19:20    








Home Medications: 








Losartan Potassium [Cozaar*] 50 mg PO DAILY 12/19/20 


hydroCHLOROthiazide [Hydrochlorothiazide*] 12.5 mg PO DAILY 12/19/20 


Albuterol Inhaler [Ventolin Inhaler*] 2 puff IH QID PRN #1 02/15/25 


Tiotropium [Spiriva Handihaler] 5 sprays IH DAILY #1 ea 02/15/25 





New Medications: 


Tiotropium [Spiriva Handihaler] 5 sprays IH DAILY #1 ea


Albuterol Inhaler [Ventolin Inhaler*] 2 puff IH QID PRN #1


 PRN Reason: Shortness Of Breath


Followup: 


Santhosh Bettencourt MD [ACTIVE - CAN ADMIT] - 


Iens Lux MD [Primary Care Provider] -

## 2025-02-24 NOTE — EKG
Test Date:    2025-02-14               Test Time:    19:16:35

Technician:                                        

                                                     

MEASUREMENT RESULTS:                                       

Intervals:                                           

Rate:         91                                     

NV:           162                                    

QRSD:         94                                     

QT:           356                                    

QTc:          437                                    

Axis:                                                

P:            56                                     

NV:           162                                    

QRS:          -77                                    

T:            63                                     

                                                     

INTERPRETIVE STATEMENTS:                                       

                                                     

Normal sinus rhythm

Incomplete right bundle branch block

Left anterior fascicular block

Abnormal ECG

Compared to ECG 12/18/2023 23:29:57

Left anterior fascicular block now present



Electronically Signed On 02-24-25 12:23:05 CST by Gregor Dumont